# Patient Record
Sex: FEMALE | Race: WHITE | NOT HISPANIC OR LATINO | URBAN - METROPOLITAN AREA
[De-identification: names, ages, dates, MRNs, and addresses within clinical notes are randomized per-mention and may not be internally consistent; named-entity substitution may affect disease eponyms.]

---

## 2017-10-27 ENCOUNTER — OFFICE VISIT (OUTPATIENT)
Dept: FAMILY MEDICINE | Facility: CLINIC | Age: 22
End: 2017-10-27
Payer: COMMERCIAL

## 2017-10-27 VITALS
DIASTOLIC BLOOD PRESSURE: 74 MMHG | WEIGHT: 273 LBS | BODY MASS INDEX: 41.37 KG/M2 | HEIGHT: 68 IN | TEMPERATURE: 98.8 F | SYSTOLIC BLOOD PRESSURE: 116 MMHG | HEART RATE: 80 BPM

## 2017-10-27 DIAGNOSIS — Z30.09 GENERAL COUNSELING FOR PRESCRIPTION OF ORAL CONTRACEPTIVES: Primary | ICD-10-CM

## 2017-10-27 PROCEDURE — 99213 OFFICE O/P EST LOW 20 MIN: CPT | Performed by: FAMILY MEDICINE

## 2017-10-27 NOTE — PROGRESS NOTES
"  SUBJECTIVE:   Randa Arriaga is a 22 year old female who presents to clinic today for the following health issues:      Patient would like to discuss different contraceptive methods.  She was on the pill for about 3 months and could not remember to take it.  This was a year ago.  She has a boyfriend but is not having sex at this time.  She has not been sexually active in the past.  She is planning on becoming sexually active.  She is interested in nexplanon.  Her menses are very heavy.  They have been coming every 28 days now.  They last about 6-7 days with 5 heavy days.      She is doing well regarding her ADHD.  She is doing well without adderall.  She is going to go to school again in the winter. 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       Problem list and histories reviewed & adjusted, as indicated.  Additional history: as documented    BP Readings from Last 3 Encounters:   10/27/17 116/74   08/26/15 119/76    Wt Readings from Last 3 Encounters:   10/27/17 273 lb (123.8 kg)   08/26/15 222 lb (100.7 kg) (99 %)*     * Growth percentiles are based on CDC 2-20 Years data.               Reviewed and updated as needed this visit by clinical staff       Reviewed and updated as needed this visit by Provider       ROS:  Constitutional, HEENT, cardiovascular, pulmonary, gi and gu systems are negative, except as otherwise noted.      OBJECTIVE:   /74 (BP Location: Right arm, Cuff Size: Adult Large)  Pulse 80  Temp 98.8  F (37.1  C) (Oral)  Ht 5' 8.11\" (1.73 m)  Wt 273 lb (123.8 kg)  LMP 09/25/2017  BMI 41.38 kg/m2  Body mass index is 41.38 kg/(m^2).  GENERAL: alert, no distress and obese  HENT: normal cephalic/atraumatic, oropharynx clear and oral mucous membranes moist  NECK: no adenopathy, no asymmetry, masses, or scars and thyroid normal to palpation  RESP: lungs clear to auscultation - no rales, rhonchi or wheezes  CV: regular rate and rhythm, normal S1 S2, no S3 or S4, no murmur, click or rub, no " peripheral edema and peripheral pulses strong  PSYCH: mentation appears normal, affect normal/bright    Diagnostic Test Results:  none     ASSESSMENT/PLAN:       ICD-10-CM    1. General counseling for prescription of oral contraceptives Z30.09      She is interested in getting a nexplanon placed by OB    FUTURE APPOINTMENTS:       - Follow-up visit with OB for nexplanon management     Shannan Enciso DO  [facility]

## 2017-10-27 NOTE — NURSING NOTE
"Chief Complaint   Patient presents with     Contraception       Initial /74 (BP Location: Right arm, Cuff Size: Adult Large)  Pulse 80  Temp 98.8  F (37.1  C) (Oral)  Ht 5' 8.11\" (1.73 m)  Wt 273 lb (123.8 kg)  LMP 09/25/2017  BMI 41.38 kg/m2 Estimated body mass index is 41.38 kg/(m^2) as calculated from the following:    Height as of this encounter: 5' 8.11\" (1.73 m).    Weight as of this encounter: 273 lb (123.8 kg).  Medication Reconciliation: complete     Maria Luisa Morrell CMA (AAMA)      "

## 2017-10-27 NOTE — MR AVS SNAPSHOT
"              After Visit Summary   10/27/2017    Randa Arriaga    MRN: [id number]           Patient Information     Date Of Birth          1995        Visit Information        Provider Department      10/27/2017 10:00 AM Shannan Enciso DO [facility]        Today's Diagnoses     General counseling for prescription of oral contraceptives    -  1       Follow-ups after your visit        Who to contact     If you have questions or need follow up information about today's clinic visit or your schedule please contact [facility] directly at [phone].  Normal or non-critical lab and imaging results will be communicated to you by [employer], letter or phone within 4 business days after the clinic has received the results. If you do not hear from us within 7 days, please contact the clinic through [employer] or phone. If you have a critical or abnormal lab result, we will notify you by phone as soon as possible.  Submit refill requests through emoquo or call your pharmacy and they will forward the refill request to us. Please allow 3 business days for your refill to be completed.          Additional Information About Your Visit        MyChart Information     [employer]o gives you secure access to your electronic health record. If you see a primary care provider, you can also send messages to your care team and make appointments. If you have questions, please call your primary care clinic.  If you do not have a primary care provider, please call [phone] and they will assist you.        Care EveryWhere ID     This is your Care EveryWhere ID. This could be used by other organizations to access your [facility] medical records  [id number]        Your Vitals Were     Pulse Temperature Height Last Period BMI (Body Mass Index)       80 98.8  F (37.1  C) (Oral) 5' 8.11\" (1.73 m) 09/25/2017 41.38 kg/m2        Blood Pressure from Last 3 Encounters:   10/27/17 116/74   08/26/15 119/76    " Weight from Last 3 Encounters:   10/27/17 273 lb (123.8 kg)   08/26/15 222 lb (100.7 kg) (99 %)*     * Growth percentiles are based on [facility] 2-20 Years data.              Today, you had the following     No orders found for display         Today's Medication Changes          These changes are accurate as of: 10/27/17 10:39 AM.  If you have any questions, ask your nurse or doctor.               Stop taking these medicines if you haven't already. Please contact your care team if you have questions.     norethindrone-ethinyl estradiol 1-35 MG-MCG per tablet   Commonly known as:  ORTHO-NOVUM 1-35 TAB,NORTREL 1-35 TAB   Stopped by:  Shannan Enciso,                     Primary Care Provider Office Phone # Fax #    Mariella Daley PA-C [phone]       [address] [facility] [address]        Equal Access to Services     [facility] : Hadii aad ku hadasho Soomaali, waaxda luqadaha, qaybta kaalmada adeegyada, waxay clintonin haybelindan isatu peters . So [facility] [phone].    ATENCIÓN: Si habla español, tiene a borja disposición servicios gratuitos de asistencia lingüística. Eli al [phone].    We comply with applicable federal civil rights laws and [place] laws. We do not discriminate on the basis of race, color, national origin, age, disability, sex, sexual orientation, or gender identity.            Thank you!     Thank you for choosing [facility]  for your care. Our goal is always to provide you with excellent care. Hearing back from our patients is one way we can continue to improve our services. Please take a few minutes to complete the written survey that you may receive in the mail after your visit with us. Thank you!             Your Updated Medication List - Protect others around you: Learn how to safely use, store and throw away your medicines at www.disposemymeds.org.      Notice  As of 10/27/2017 10:39 AM    You have not been prescribed any medications.

## 2017-11-21 ENCOUNTER — OFFICE VISIT (OUTPATIENT)
Dept: OBGYN | Facility: CLINIC | Age: 22
End: 2017-11-21
Payer: COMMERCIAL

## 2017-11-21 VITALS
HEIGHT: 68 IN | WEIGHT: 274.4 LBS | TEMPERATURE: 98.3 F | DIASTOLIC BLOOD PRESSURE: 72 MMHG | SYSTOLIC BLOOD PRESSURE: 112 MMHG | HEART RATE: 64 BPM | BODY MASS INDEX: 41.59 KG/M2

## 2017-11-21 DIAGNOSIS — Z30.017 ENCOUNTER FOR INITIAL PRESCRIPTION OF IMPLANTABLE SUBDERMAL CONTRACEPTIVE: ICD-10-CM

## 2017-11-21 DIAGNOSIS — Z30.017 NEXPLANON INSERTION: Primary | ICD-10-CM

## 2017-11-21 DIAGNOSIS — N92.0 MENORRHAGIA WITH REGULAR CYCLE: ICD-10-CM

## 2017-11-21 LAB — BETA HCG QUAL IFA URINE: NEGATIVE

## 2017-11-21 PROCEDURE — 84703 CHORIONIC GONADOTROPIN ASSAY: CPT | Performed by: OBSTETRICS & GYNECOLOGY

## 2017-11-21 PROCEDURE — 99201 ZZC OFFICE/OUTPT VISIT, NEW, LEVEL I: CPT | Mod: 25 | Performed by: OBSTETRICS & GYNECOLOGY

## 2017-11-21 PROCEDURE — 11981 INSERTION DRUG DLVR IMPLANT: CPT | Performed by: OBSTETRICS & GYNECOLOGY

## 2017-11-21 NOTE — MR AVS SNAPSHOT
After Visit Summary   11/21/2017    Randa Arriaga    MRN: [id number]           Patient Information     Date Of Birth          1995        Visit Information        Provider Department      11/21/2017 2:30 PM Candi Daniels MD [facility]        Today's Diagnoses     Nexplanon insertion    -  1      Care Instructions    What Nexplanon Users May Expect    For appropiate patients, Nexplanon is well tolerated and has a low early-removal rate.    Insertion site complications, such as prolonged pain or infection, are rare. Removal is occasionally difficult, and rarely requires a surgical procedure in the operating room.    Menstrual changes are common with Nexplanon. Bleeding may become more or less frequent or heavy, or absent. The bleeding pattern after the first three months is predictive of future bleeding, but the pattern may change at any time. Average bleeding is 18 days over 3 months. Over 50% of women experience rare over absent bleeding over the two year period, while 25% experience frequent or prolonged bleeding.    In clinical studies, users gained 3.7 pounds over two years. It is unknown what portion of this weight gain is related to Nexplanon    Women with a history of depressed mood may have worsening on Nexplanon, and may need to have the device removed.    Return to baseline ovulation patterns is seen 7-14 days after removal of Nexplanon.    Rarely, headaches and acne have also let to device removal.    Nexplanon may be less effective in women weight more than 130% of their ideal body weight.    Nexplanon does not protect against HIV or STDs.    Please call [facility] at [phone] if you have questions or concerns.    For more complete information:  [email or web address]              Follow-ups after your visit        Who to contact     If you have questions or need follow up information about  "today's clinic visit or your schedule please contact [facility] directly at [phone].  Normal or non-critical lab and imaging results will be communicated to you by Jay[employer]hart, letter or phone within 4 business days after the clinic has received the results. If you do not hear from us within 7 days, please contact the clinic through [employer] or phone. If you have a critical or abnormal lab result, we will notify you by phone as soon as possible.  Submit refill requests through [employer] or call your pharmacy and they will forward the refill request to us. Please allow 3 business days for your refill to be completed.          Additional Information About Your Visit        Jayp[employer]stream Information     [employer] gives you secure access to your electronic health record. If you see a primary care provider, you can also send messages to your care team and make appointments. If you have questions, please call your primary care clinic.  If you do not have a primary care provider, please call [phone] and they will assist you.        Care EveryWhere ID     This is your Care EveryWhere ID. This could be used by other organizations to access your [facility] medical records  [id number]        Your Vitals Were     Pulse Temperature Height Last Period Breastfeeding? BMI (Body Mass Index)    64 98.3  F (36.8  C) (Oral) 5' 8.11\" (1.73 m) 10/29/2017 (Approximate) No 41.59 kg/m2       Blood Pressure from Last 3 Encounters:   11/21/17 112/72   10/27/17 116/74   08/26/15 119/76    Weight from Last 3 Encounters:   11/21/17 274 lb 6.4 oz (124.5 kg)   10/27/17 273 lb (123.8 kg)   08/26/15 222 lb (100.7 kg) (99 %)*     * Growth percentiles are based on CDC 2-20 Years data.              We Performed the Following     Beta HCG qual IFA urine        Primary Care Provider Office Phone # Fax #    Mariella Daley PA-C [phone]       [address] [facility] [address]        Equal Access to Services     " NEMESIO KRUSE : Hadii aad ku hadmegganbinh Sharon, wajenniferda luqadaha, qaybta kaalmada isatulauriroberto, maci zamoraaramjorge l mathur. So [facility] [place] [phone].    ATENCIÓN: Si habla español, tiene a borja disposición servicios gratuitos de asistencia lingüística. Llame al [phone].    We comply with applicable federal civil rights laws and [place] laws. We do not discriminate on the basis of race, color, national origin, age, disability, sex, sexual orientation, or gender identity.            Thank you!     Thank you for choosing [facility]  for your care. Our goal is always to provide you with excellent care. Hearing back from our patients is one way we can continue to improve our services. Please take a few minutes to complete the written survey that you may receive in the mail after your visit with us. Thank you!             Your Updated Medication List - Protect others around you: Learn how to safely use, store and throw away your medicines at www.disposemymeds.org.      Notice  As of 11/21/2017  3:06 PM    You have not been prescribed any medications.

## 2017-11-21 NOTE — PATIENT INSTRUCTIONS
What Nexplanon Users May Expect    For appropiate patients, Nexplanon is well tolerated and has a low early-removal rate.    Insertion site complications, such as prolonged pain or infection, are rare. Removal is occasionally difficult, and rarely requires a surgical procedure in the operating room.    Menstrual changes are common with Nexplanon. Bleeding may become more or less frequent or heavy, or absent. The bleeding pattern after the first three months is predictive of future bleeding, but the pattern may change at any time. Average bleeding is 18 days over 3 months. Over 50% of women experience rare over absent bleeding over the two year period, while 25% experience frequent or prolonged bleeding.    In clinical studies, users gained 3.7 pounds over two years. It is unknown what portion of this weight gain is related to Nexplanon    Women with a history of depressed mood may have worsening on Nexplanon, and may need to have the device removed.    Return to baseline ovulation patterns is seen 7-14 days after removal of Nexplanon.    Rarely, headaches and acne have also let to device removal.    Nexplanon may be less effective in women weight more than 130% of their ideal body weight.    Nexplanon does not protect against HIV or STDs.    Please call [facility] at [phone] if you have questions or concerns.    For more complete information:  [email or web address]

## 2017-11-21 NOTE — PROGRESS NOTES
"Chief Complaint   Patient presents with     Consult     Contraception       Initial /72 (BP Location: Right arm, Patient Position: Sitting, Cuff Size: Adult Large)  Pulse 64  Temp 98.3  F (36.8  C) (Oral)  Ht 5' 8.11\" (1.73 m)  Wt 274 lb 6.4 oz (124.5 kg)  LMP 10/29/2017 (Approximate)  Breastfeeding? No  BMI 41.59 kg/m2 Estimated body mass index is 41.59 kg/(m^2) as calculated from the following:    Height as of this encounter: 5' 8.11\" (1.73 m).    Weight as of this encounter: 274 lb 6.4 oz (124.5 kg).  BP completed using cuff size: large        The following HM Due: NONE      The following patient reported/Care Every where data was sent to:  P ABSTRACT QUALITY INITIATIVES [64445]  n/a      n/a and patient has appointment for today          NEXPLANON  LOT: [id number]  EXP: 2019   NDC: 0554-1136-61      I was asked to see Randa Arriaga  by Dr Enciso  for consultation regarding nexplanon for contraception and cycle control    HPI:  Randa Arriaga  is a 22 year old here for a consultation regarding: wants to get on nexplanon.  She has discussed this with Dr Enciso and again today with me.  She has heavy periods and is hoping that this might help with her bleeding. In addition she is in a relationship and will need contraception as well.  She is not sexually active at the present time.    Periods are monthly and regular. On the heavy days she will go thru 2 tampons in one hour. Hoping this might help with the bleeding    Currently considering nexplanon for reliable non estrogen reversible contraception.   Tried oral contraceptive pills in the past but would forget to take it.     Currently in a relationship  Patient's last menstrual period was Patient's last menstrual period was 10/29/2017 (approximate).     Left  is her dominant hand         No past medical history on file.     Past Surgical History:   Procedure Laterality Date     ENT SURGERY      PE tubes under 1 year      ORTHOPEDIC " "SURGERY      9th right hand 2nd digit finger       Social History   Substance Use Topics     Smoking status: Never Smoker     Smokeless tobacco: Never Used     Alcohol use No       No current outpatient prescriptions on file.        No Known Allergies        EXAM:    /72 (BP Location: Right arm, Patient Position: Sitting, Cuff Size: Adult Large)  Pulse 64  Temp 98.3  F (36.8  C) (Oral)  Ht 5' 8.11\" (1.73 m)  Wt 274 lb 6.4 oz (124.5 kg)  LMP 10/29/2017 (Approximate)  Breastfeeding? No  BMI 41.59 kg/m2    General - pleasant female in no acute distress.  Neurological - alert and oriented X 3  Psychiatric - normal mood and affect  Right inner arm normal.      we spent over 10 min of today's visit discussing contraceptive options and specific R/B/SE of nexplanon.         ASSESSMENT:     Encounter Diagnoses   Name Primary?     Nexplanon insertion Yes     Menorrhagia with regular cycle      Encounter for initial prescription of implantable subdermal contraceptive         PLAN:   Desires nexplanon.  we spent over 10 min of today's visit discussing contraceptive options and specific R/B/SE of nexplanon.  We specifically reviewed the risk of bleeding. We discussed options for management of abnormal bleeding with nexplanon as well.   A complete discussion of the risks and benefits of nexplanon use and the details of the insertion procedure was held with the patient. All questions were answered. A consent form was signed.       Candi Daniels MD           PROCEDURE:    Preprocedure medications: 1% plain lidocaine, 5 ml  Nexplanon Lot # see nursing notes    Patient's allergies were confirmed. The patient was placed in the supine position with her (non-dominant) arm flexed at the elbow, externally rotated, and placed with her wrist parallel to her ear.  The insertion site was marked with a sterile marker. The area was cleaned with alcohol swabs and anesthetized with 5 ml of 1% lidocaine without epinephrine along " the planned insertion tunnel. Betadine swabs were used to prep the area of insertion.  The Nexplanon applicator was removed from its blister. The needle shield was removed, maintaining the applicator upright. The white implant was visualized in the needle tip. Counter-traction was applied to the skin at the needle insertion site.  The tip of the needle was inserted at the site, beveled side up, at a 30-degree angle. The applicator was then lowered to a horizontal position. While lifting the skin with the tip of the needle, the needle was inserted to its full length. The slider was unlocked and moved fully back to the stop.   The 4 cm shayla was palpated under the skin. The patient also palpated the shayla. Pressure was held over the incision and steri strips were placed.   A bandage was applied with sterile gauze. The patient was instructed to remove the bangage in 24 hours and replace with a band-aid.  The patient tolerated the procedures well and there were no complications.     The user card was filled out and given to the patient to keep.  The Patient Chart Label was completed and sent for scanning.       A/P:  Contraception management   successful nexplanon insertion   We discussed signs/symptoms of infection and when to call.  We again reviewed potential side effects including irregular bleeding.  She is to call with any questions.  Otherwise, RTC prn.        FOLLOW-UP:  She was asked to follow up for any problems.   The patient was asked to contact the clinic for any fever/chills/insertion site pain or heavy bleeding.     Candi Daniels MD     Cc DR. Shannan Enciso

## 2018-01-15 ENCOUNTER — TELEPHONE (OUTPATIENT)
Dept: FAMILY MEDICINE | Facility: CLINIC | Age: 23
End: 2018-01-15

## 2018-01-15 NOTE — TELEPHONE ENCOUNTER
1/15/2018    Call Regarding ReattributionPhysical    Attempt 1    Message on voicemail     Comments:           Outreach   AT

## 2018-02-23 NOTE — TELEPHONE ENCOUNTER
2/23/2018    Call Regarding Reattribution Physical     Attempt 2    Message on voicemail     Comments:       Outreach   Kayla Benavidez

## 2018-08-28 ENCOUNTER — HEALTH MAINTENANCE LETTER (OUTPATIENT)
Age: 23
End: 2018-08-28

## 2018-09-26 ENCOUNTER — OFFICE VISIT (OUTPATIENT)
Dept: PODIATRY | Facility: CLINIC | Age: 23
End: 2018-09-26
Payer: COMMERCIAL

## 2018-09-26 VITALS
WEIGHT: 230 LBS | BODY MASS INDEX: 32.2 KG/M2 | DIASTOLIC BLOOD PRESSURE: 70 MMHG | HEIGHT: 71 IN | SYSTOLIC BLOOD PRESSURE: 118 MMHG

## 2018-09-26 DIAGNOSIS — L60.0 INGROWING NAIL: Primary | ICD-10-CM

## 2018-09-26 PROCEDURE — 99203 OFFICE O/P NEW LOW 30 MIN: CPT | Mod: 25 | Performed by: PODIATRIST

## 2018-09-26 PROCEDURE — 11730 AVULSION NAIL PLATE SIMPLE 1: CPT | Performed by: PODIATRIST

## 2018-09-26 ASSESSMENT — PAIN SCALES - GENERAL: PAINLEVEL: MODERATE PAIN (4)

## 2018-09-26 NOTE — PROGRESS NOTES
"PATIENT HISTORY:  Randa Arriaga is a 23 year old female who presents to clinic for L 1st toe pain.  Pain along the nail.  2-3 month duration.  Started after stubbing the toe.  1st occurrence.  4-7/10 pain.  Worse with pressure, better with rest.      Review of Systems:  Patient denies fever, chills, rash, wound, stiffness, limping, numbness, weakness, heart burn, blood in stool, chest pain with activity, calf pain when walking, shortness of breath with activity, chronic cough, easy bleeding/bruising, swelling of ankles, excessive thirst, fatigue, depression, anxiety.       PAST MEDICAL HISTORY: No pertinent past medical history.       PAST SURGICAL HISTORY:   Past Surgical History:   Procedure Laterality Date     ENT SURGERY      PE tubes under 1 year      ORTHOPEDIC SURGERY      9th right hand 2nd digit finger        MEDICATIONS: No current outpatient prescriptions on file.     ALLERGIES:  No Known Allergies     SOCIAL HISTORY:   Social History     Social History     Marital status: Single     Spouse name: N/A     Number of children: N/A     Years of education: N/A     Occupational History     Not on file.     Social History Main Topics     Smoking status: Never Smoker     Smokeless tobacco: Never Used     Alcohol use No     Drug use: No     Sexual activity: Not Currently     Partners: Male     Birth control/ protection: None     Other Topics Concern     Parent/Sibling W/ Cabg, Mi Or Angioplasty Before 65f 55m? No     Social History Narrative        FAMILY HISTORY: No pertinent family history.     EXAM:Vitals: /70  Ht 5' 11\" (1.803 m)  Wt 230 lb (104.3 kg)  BMI 32.08 kg/m2  BMI= Body mass index is 32.08 kg/(m^2).    General appearance: Patient is alert and fully cooperative with history & exam.  No sign of distress is noted during the visit.     Psychiatric: Affect is pleasant & appropriate.  Patient appears motivated to improve health.     Respiratory: Breathing is regular & unlabored while sitting.   "   HEENT: Hearing is intact to spoken word.  Speech is clear.  No gross evidence of visual impairment that would impact ambulation.     Dermatologic: L 1st toenail ingrown along lateral border with mild local redness.  No drainage.  Pain noted.       Vascular: DP & PT pulses are intact & regular on the L  No significant edema or varicosities noted.  CFT and skin temperature are normal.     Neurologic: Lower extremity sensation is intact to light touch.  No evidence of weakness or contracture in the lower extremities.  No evidence of neuropathy.     Musculoskeletal: Patient is ambulatory without assistive device or brace.  No gross ankle deformity noted.  No foot or ankle joint effusion is noted.     ASSESSMENT: L 1st toe ingrown nail     PLAN:  Reviewed patient's chart in epic.  Discussed condition and treatment options including pros and cons.    The potential causes and nature of an ingrown toenail were discussed with the patient.  We reviewed the natural history/prognosis of the condition and potential risks if no treatment is provided.      Treatment options discussed included conservative management (oral antibiotics, soaking of foot, adequate width shoes)  as well as surgical management (partial or total nail removal).  The pros and cons of both forms of treatment were reviewed.      After thorough discussion and answering all questions, the patient elected to proceed with nonpermanent partial nail avulsion.  Discussed risk of recurrence.    Procedure:  In addition to office visit.  After consent, the left 1st toe was anesthetized with 5cc's of 1% lidocaine plain after alcohol prep. Betadine prep performed.  A tourniquet was applied to the toe. Using sterile instrumentation, the lateral border was then raised from the nail bed and then cut the length of the nail.  The offending nail border was then removed.  Bacitracin was applied to the nail bed.  The tourniquet was removed and a hyperemic response was noted.   Bandage was applied to the toe.  The patient tolerated the procedure and anesthesia well.  No abscess noted.    Post op instructions provided and discussed with pt.  F/u prn.  I don't see a need for oral abx.       Roberto Carlos Lucas DPM, FACFAS    Weight management plan: Patient was referred to their PCP to discuss a diet and exercise plan.

## 2018-09-26 NOTE — MR AVS SNAPSHOT
After Visit Summary   9/26/2018    Randa Arriaga    MRN: [id number]           Patient Information     Date Of Birth          1995        Visit Information        Provider Department      9/26/2018 11:15 AM Roberto Carlos Silva DPM [facility]        Today's Diagnoses     Ingrowing nail    -  1      Care Instructions    Thank you for choosing [facility]!    Follow up as needed     DR. SILVA'S CLINIC LOCATIONS     MONDAY  [place] TUESDAY & FRIDAY AM  EMILY   [address] [facility]   [address]   [place] [address] [place] [address]   [phone]  [phone]       WEDNESDAY  [place] SCHEDULE SURGERY: [phone]   [address] [place] [address] APPOINTMENTS: [phone]   [place] [address] BILLING QUESTIONS: [phone]   [phone]       Ingrown Toenail          What Is an Ingrown Toenail?  When a toenail is ingrown, it is curved and grows into the skin, usually at the nail borders (the sides of the nail). This  digging in  of the nail irritates the skin, often creating pain, redness, swelling, and warmth in the toe.  If an ingrown nail causes a break in the skin, bacteria may enter and cause an infection in the area, which is often marked by drainage and a foul odor. However, even if the toe isn t painful, red, swollen, or warm, a nail that curves downward into the skin can progress to an infection.  Causes  Causes of ingrown toenails include:  Heredity. In many people, the tendency for ingrown toenails is inherited.   Trauma. Sometimes an ingrown toenail is the result of trauma, such as stubbing your toe, having an object fall on your toe, or engaging in activities that involve repeated pressure on the toes, such as kicking or running.   Improper trimming. The most common cause of ingrown toenails is cutting your nails too short. This encourages the skin next to the  nail to fold over the nail.   Improperly sized footwear. Ingrown toenails can result from wearing socks and shoes that are tight or short.   Nail Conditions. Ingrown toenails can be caused by nail problems, such as fungal infections or losing a nail due to trauma.   Treatment  Sometimes initial treatment for ingrown toenails can be safely performed at home. However, home treatment is strongly discouraged if an infection is suspected, or for those who have medical conditions that put feet at high risk, such as diabetes, nerve damage in the foot, or poor circulation.  Home care:  If you don t have an infection or any of the above medical conditions, you can soak your foot in room-temperature water (adding Epsom s salt may be recommended by your doctor), and gently massage the side of the nail fold to help reduce the inflammation.  Avoid attempting  bathroom surgery.  Repeated cutting of the nail can cause the condition to worsen over time. If your symptoms fail to improve, it s time to see a foot and ankle surgeon.  Physician care:  After examining the toe, the foot and ankle surgeon will select the treatment best suited for you. If an infection is present, an oral antibiotic may be prescribed.  Sometimes a minor surgical procedure, often performed in the office, will ease the pain and remove the offending nail. After applying a local anesthetic, the doctor removes part of the nail s side border. Some nails may become ingrown again, requiring removal of the nail root.  Following the nail procedure, a light bandage will be applied. Most people experience very little pain after surgery and may resume normal activity the next day. If your surgeon has prescribed an oral antibiotic, be sure to take all the medication, even if your symptoms have improved.      Preventing Ingrown Toenails  Many cases of ingrown toenails may be prevented by:  Proper trimming. Cut toenails in a fairly straight line, and don t cut them too  short. You should be able to get your fingernail under the sides and end of the nail.   Well-fitted shoes and socks. Don t wear shoes that are short or tight in the toe area. Avoid shoes that are loose, because they too cause pressure on the toes, especially when running or walking briskly.           INGROWN TOENAIL POSTOPERATIVE INSTRUCTIONS   (Nail avulsion or chemical matrixectomy)   1 Go directly home and elevate the affected foot on one or two pillows for the remainder of the day/evening. Your toe may stay numb for the next 2-8 hours.   2 Take Tylenol, ibuprofen or another anti-inflammatory as needed for pain.   3 Take antibiotic if that has been prescribed. Take the entire prescribed antibiotic even if your symptoms have improved.   4 Keep dressing dry and intact the day of the procedure. The morning after the procedure, remove entire dressing and soak/wash the affected area in warm water (you may add Epsom salt) for 5 to 10 minutes. Do this twice a day for 2-4 weeks (6-8 weeks if you had phenol) (you may count showering/bathing as one soak).  After soaks, pat the area dry and then allow to airdry for a few minutes. Apply antibiotic ointment to the area and cover with 2 X 2 gauze and paper tape or a band-aid.  5 May walk and pursue everyday activities as tolerated with either an open toe shoe or cut-out shoe as needed. May wear regular shoes if no pain is noted.  6 Watch for any signs and symptoms of infection such as: redness, red streaks going up the foot/leg, swelling, pus or foul odor. Those that have had the phenol procedure, the toe will drain longer and will look like it is infected because it is a chemical burn.  Please call with questions.        Body Mass Index (BMI)  Many things can cause foot and ankle problems. Foot structure, activity level, foot mechanics and injuries are common causes of pain.  One very important issue that often goes unmentioned, is body weight. Extra weight can cause  increased stress on muscles, ligaments, bones and tendons.  Sometimes just a few extra pounds is all it takes to put one over her/his threshold. Without reducing that stress, it can be difficult to alleviate pain. Some people are uncomfortable addressing this issue, but we feel it is important for you to think about it. As Foot &  Ankle specialists, our job is addressing the lower extremity problem and possible causes. Regarding extra body weight, we encourage patients to discuss diet and weight management plans with their primary care doctors. It is this team approach that gives you the best opportunity for pain relief and getting you back on your feet.            Follow-ups after your visit        Who to contact     If you have questions or need follow up information about today's clinic visit or your schedule please contact [facility] directly at [phone].  Normal or non-critical lab and imaging results will be communicated to you by Sample6hart, letter or phone within 4 business days after the clinic has received the results. If you do not hear from us within 7 days, please contact the clinic through Sample6hart or phone. If you have a critical or abnormal lab result, we will notify you by phone as soon as possible.  Submit refill requests through SwypeShield or call your pharmacy and they will forward the refill request to us. Please allow 3 business days for your refill to be completed.          Additional Information About Your Visit        SwypeShield Information     SwypeShield gives you secure access to your electronic health record. If you see a primary care provider, you can also send messages to your care team and make appointments. If you have questions, please call your primary care clinic.  If you do not have a primary care provider, please call [phone] and they will assist you.        Care EveryWhere ID     This is your Care EveryWhere ID. This could be used by other organizations to access  "your [facility] medical records  [id number]        Your Vitals Were     Height BMI (Body Mass Index)                5' 11\" (1.803 m) 32.08 kg/m2           Blood Pressure from Last 3 Encounters:   09/26/18 118/70   11/21/17 112/72   10/27/17 116/74    Weight from Last 3 Encounters:   09/26/18 230 lb (104.3 kg)   11/21/17 274 lb 6.4 oz (124.5 kg)   10/27/17 273 lb (123.8 kg)              We Performed the Following     REMOVAL OF NAIL PLATE SIMPLE SINGLE        Primary Care Provider Office Phone # Fax #    Mariella Daley PA-C [phone]       [address] [address]        Equal Access to Services     NEMESIO KRUSE : Hadii aad ku hadasho Soomaali, waaxda luqadaha, qaybta kaalmada adeegyada, maci enriquez hayzack peters . So [facility] [phone].    ATENCIÓN: Si habla español, tiene a borja disposición servicios gratuitos de asistencia lingüística. Llame al [phone].    We comply with applicable federal civil rights laws and [place] laws. We do not discriminate on the basis of race, color, national origin, age, disability, sex, sexual orientation, or gender identity.            Thank you!     Thank you for choosing [facility]  for your care. Our goal is always to provide you with excellent care. Hearing back from our patients is one way we can continue to improve our services. Please take a few minutes to complete the written survey that you may receive in the mail after your visit with us. Thank you!             Your Updated Medication List - Protect others around you: Learn how to safely use, store and throw away your medicines at www.disposemymeds.org.      Notice  As of 9/26/2018 11:50 AM    You have not been prescribed any medications.      "

## 2018-09-26 NOTE — LETTER
"    9/26/2018         RE: Randa Arriaga  [address] [address]        Dear Colleague,    Thank you for referring your patient, Randa Arriaga, to the [facility]. Please see a copy of my visit note below.    PATIENT HISTORY:  Randa Arriaga is a 23 year old female who presents to clinic for L 1st toe pain.  Pain along the nail.  2-3 month duration.  Started after stubbing the toe.  1st occurrence.  4-7/10 pain.  Worse with pressure, better with rest.      Review of Systems:  Patient denies fever, chills, rash, wound, stiffness, limping, numbness, weakness, heart burn, blood in stool, chest pain with activity, calf pain when walking, shortness of breath with activity, chronic cough, easy bleeding/bruising, swelling of ankles, excessive thirst, fatigue, depression, anxiety.       PAST MEDICAL HISTORY: No pertinent past medical history.       PAST SURGICAL HISTORY:   Past Surgical History:   Procedure Laterality Date     ENT SURGERY      PE tubes under 1 year      ORTHOPEDIC SURGERY      9th right hand 2nd digit finger        MEDICATIONS: No current outpatient prescriptions on file.     ALLERGIES:  No Known Allergies     SOCIAL HISTORY:   Social History     Social History     Marital status: Single     Spouse name: N/A     Number of children: N/A     Years of education: N/A     Occupational History     Not on file.     Social History Main Topics     Smoking status: Never Smoker     Smokeless tobacco: Never Used     Alcohol use No     Drug use: No     Sexual activity: Not Currently     Partners: Male     Birth control/ protection: None     Other Topics Concern     Parent/Sibling W/ Cabg, Mi Or Angioplasty Before 65f 55m? No     Social History Narrative        FAMILY HISTORY: No pertinent family history.     EXAM:Vitals: /70  Ht 5' 11\" (1.803 m)  Wt 230 lb (104.3 kg)  BMI 32.08 kg/m2  BMI= Body mass index is 32.08 kg/(m^2).    General appearance: Patient is alert and " fully cooperative with history & exam.  No sign of distress is noted during the visit.     Psychiatric: Affect is pleasant & appropriate.  Patient appears motivated to improve health.     Respiratory: Breathing is regular & unlabored while sitting.     HEENT: Hearing is intact to spoken word.  Speech is clear.  No gross evidence of visual impairment that would impact ambulation.     Dermatologic: L 1st toenail ingrown along lateral border with mild local redness.  No drainage.  Pain noted.       Vascular: DP & PT pulses are intact & regular on the L  No significant edema or varicosities noted.  CFT and skin temperature are normal.     Neurologic: Lower extremity sensation is intact to light touch.  No evidence of weakness or contracture in the lower extremities.  No evidence of neuropathy.     Musculoskeletal: Patient is ambulatory without assistive device or brace.  No gross ankle deformity noted.  No foot or ankle joint effusion is noted.     ASSESSMENT: L 1st toe ingrown nail     PLAN:  Reviewed patient's chart in epic.  Discussed condition and treatment options including pros and cons.    The potential causes and nature of an ingrown toenail were discussed with the patient.  We reviewed the natural history/prognosis of the condition and potential risks if no treatment is provided.      Treatment options discussed included conservative management (oral antibiotics, soaking of foot, adequate width shoes)  as well as surgical management (partial or total nail removal).  The pros and cons of both forms of treatment were reviewed.      After thorough discussion and answering all questions, the patient elected to proceed with nonpermanent partial nail avulsion.  Discussed risk of recurrence.    Procedure:  In addition to office visit.  After consent, the left 1st toe was anesthetized with 5cc's of 1% lidocaine plain after alcohol prep. Betadine prep performed.  A tourniquet was applied to the toe. Using sterile  instrumentation, the lateral border was then raised from the nail bed and then cut the length of the nail.  The offending nail border was then removed.  Bacitracin was applied to the nail bed.  The tourniquet was removed and a hyperemic response was noted.  Bandage was applied to the toe.  The patient tolerated the procedure and anesthesia well.  No abscess noted.    Post op instructions provided and discussed with pt.  F/u prn.  I don't see a need for oral abx.       Roberto Carlos Lucas DPM, FACFAS    Weight management plan: Patient was referred to their PCP to discuss a diet and exercise plan.      Again, thank you for allowing me to participate in the care of your patient.        Sincerely,        Roberto Carlos Lucas DPM

## 2018-09-26 NOTE — PATIENT INSTRUCTIONS
Thank you for choosing [facility]!    Follow up as needed     DR. SILVA'S CLINIC LOCATIONS     MONDAY  [place] TUESDAY & FRIDAY AM  EMILY   [address]   [address]   [place] [address] [place] [address]   [phone]  [phone]       WEDNESDAY  [place] SCHEDULE SURGERY: [phone]   [address] [facility] APPOINTMENTS: [phone]   [place] [address] BILLING QUESTIONS: [phone]   [phone]       Ingrown Toenail          What Is an Ingrown Toenail?  When a toenail is ingrown, it is curved and grows into the skin, usually at the nail borders (the sides of the nail). This  digging in  of the nail irritates the skin, often creating pain, redness, swelling, and warmth in the toe.  If an ingrown nail causes a break in the skin, bacteria may enter and cause an infection in the area, which is often marked by drainage and a foul odor. However, even if the toe isn t painful, red, swollen, or warm, a nail that curves downward into the skin can progress to an infection.  Causes  Causes of ingrown toenails include:  Heredity. In many people, the tendency for ingrown toenails is inherited.   Trauma. Sometimes an ingrown toenail is the result of trauma, such as stubbing your toe, having an object fall on your toe, or engaging in activities that involve repeated pressure on the toes, such as kicking or running.   Improper trimming. The most common cause of ingrown toenails is cutting your nails too short. This encourages the skin next to the nail to fold over the nail.   Improperly sized footwear. Ingrown toenails can result from wearing socks and shoes that are tight or short.   Nail Conditions. Ingrown toenails can be caused by nail problems, such as fungal infections or losing a nail due to trauma.   Treatment  Sometimes initial treatment for ingrown toenails can be safely performed at home.  However, home treatment is strongly discouraged if an infection is suspected, or for those who have medical conditions that put feet at high risk, such as diabetes, nerve damage in the foot, or poor circulation.  Home care:  If you don t have an infection or any of the above medical conditions, you can soak your foot in room-temperature water (adding Epsom s salt may be recommended by your doctor), and gently massage the side of the nail fold to help reduce the inflammation.  Avoid attempting  bathroom surgery.  Repeated cutting of the nail can cause the condition to worsen over time. If your symptoms fail to improve, it s time to see a foot and ankle surgeon.  Physician care:  After examining the toe, the foot and ankle surgeon will select the treatment best suited for you. If an infection is present, an oral antibiotic may be prescribed.  Sometimes a minor surgical procedure, often performed in the office, will ease the pain and remove the offending nail. After applying a local anesthetic, the doctor removes part of the nail s side border. Some nails may become ingrown again, requiring removal of the nail root.  Following the nail procedure, a light bandage will be applied. Most people experience very little pain after surgery and may resume normal activity the next day. If your surgeon has prescribed an oral antibiotic, be sure to take all the medication, even if your symptoms have improved.      Preventing Ingrown Toenails  Many cases of ingrown toenails may be prevented by:  Proper trimming. Cut toenails in a fairly straight line, and don t cut them too short. You should be able to get your fingernail under the sides and end of the nail.   Well-fitted shoes and socks. Don t wear shoes that are short or tight in the toe area. Avoid shoes that are loose, because they too cause pressure on the toes, especially when running or walking briskly.           INGROWN TOENAIL POSTOPERATIVE INSTRUCTIONS   (Nail avulsion or  chemical matrixectomy)   1 Go directly home and elevate the affected foot on one or two pillows for the remainder of the day/evening. Your toe may stay numb for the next 2-8 hours.   2 Take Tylenol, ibuprofen or another anti-inflammatory as needed for pain.   3 Take antibiotic if that has been prescribed. Take the entire prescribed antibiotic even if your symptoms have improved.   4 Keep dressing dry and intact the day of the procedure. The morning after the procedure, remove entire dressing and soak/wash the affected area in warm water (you may add Epsom salt) for 5 to 10 minutes. Do this twice a day for 2-4 weeks (6-8 weeks if you had phenol) (you may count showering/bathing as one soak).  After soaks, pat the area dry and then allow to airdry for a few minutes. Apply antibiotic ointment to the area and cover with 2 X 2 gauze and paper tape or a band-aid.  5 May walk and pursue everyday activities as tolerated with either an open toe shoe or cut-out shoe as needed. May wear regular shoes if no pain is noted.  6 Watch for any signs and symptoms of infection such as: redness, red streaks going up the foot/leg, swelling, pus or foul odor. Those that have had the phenol procedure, the toe will drain longer and will look like it is infected because it is a chemical burn.  Please call with questions.        Body Mass Index (BMI)  Many things can cause foot and ankle problems. Foot structure, activity level, foot mechanics and injuries are common causes of pain.  One very important issue that often goes unmentioned, is body weight. Extra weight can cause increased stress on muscles, ligaments, bones and tendons.  Sometimes just a few extra pounds is all it takes to put one over her/his threshold. Without reducing that stress, it can be difficult to alleviate pain. Some people are uncomfortable addressing this issue, but we feel it is important for you to think about it. As Foot &  Ankle specialists, our job is addressing  the lower extremity problem and possible causes. Regarding extra body weight, we encourage patients to discuss diet and weight management plans with their primary care doctors. It is this team approach that gives you the best opportunity for pain relief and getting you back on your feet.

## 2019-01-09 ENCOUNTER — OFFICE VISIT (OUTPATIENT)
Dept: PODIATRY | Facility: CLINIC | Age: 24
End: 2019-01-09
Payer: COMMERCIAL

## 2019-01-09 VITALS
HEIGHT: 71 IN | DIASTOLIC BLOOD PRESSURE: 84 MMHG | HEART RATE: 68 BPM | SYSTOLIC BLOOD PRESSURE: 112 MMHG | BODY MASS INDEX: 39.34 KG/M2 | WEIGHT: 281 LBS | TEMPERATURE: 98 F

## 2019-01-09 DIAGNOSIS — L60.0 INGROWING NAIL: Primary | ICD-10-CM

## 2019-01-09 PROCEDURE — 11730 AVULSION NAIL PLATE SIMPLE 1: CPT | Performed by: PODIATRIST

## 2019-01-09 ASSESSMENT — MIFFLIN-ST. JEOR: SCORE: 2135.74

## 2019-01-09 NOTE — LETTER
1/9/2019         RE: Randa Arriaga  [address] [facility] [address]        Dear Colleague,    Thank you for referring your patient, Randa Arriaga, to the [facility]. Please see a copy of my visit note below.    Weight management plan: Patient was referred to their PCP to discuss a diet and exercise plan.      PATIENT HISTORY:  Randa Arriaga is a 23 year old female who presents to clinic for right 1st toe pain.  Hx of L ingrown nail.  Pain along R 1st toenail.  2 week duration.  1st occurrence.  No treatments.    Review of Systems:  Patient denies fever, chills, rash, wound, stiffness, limping, numbness, weakness, heart burn, blood in stool, chest pain with activity, calf pain when walking, shortness of breath with activity, chronic cough, easy bleeding/bruising, swelling of ankles, excessive thirst, fatigue, depression, anxiety.      PAST MEDICAL HISTORY: History reviewed. No pertinent past medical history.     PAST SURGICAL HISTORY:   Past Surgical History:   Procedure Laterality Date     ENT SURGERY      PE tubes under 1 year      ORTHOPEDIC SURGERY      9th right hand 2nd digit finger        MEDICATIONS: No current outpatient medications on file.     ALLERGIES:  No Known Allergies     SOCIAL HISTORY:   Social History     Socioeconomic History     Marital status: Single     Spouse name: Not on file     Number of children: Not on file     Years of education: Not on file     Highest education level: Not on file   Social Needs     Financial resource strain: Not on file     Food insecurity - worry: Not on file     Food insecurity - inability: Not on file     Transportation needs - medical: Not on file     Transportation needs - non-medical: Not on file   Occupational History     Not on file   Tobacco Use     Smoking status: Never Smoker     Smokeless tobacco: Never Used   Substance and Sexual Activity     Alcohol use: No     Drug use: No     Sexual activity: Not Currently      "Partners: Male     Birth control/protection: None   Other Topics Concern     Parent/sibling w/ CABG, MI or angioplasty before 65F 55M? No   Social History Narrative     Not on file        FAMILY HISTORY: History reviewed. No pertinent family history.     EXAM:Vitals: /84 (Cuff Size: Adult Large)   Pulse 68   Temp 98  F (36.7  C) (Oral)   Ht 1.803 m (5' 11\")   Wt 127.5 kg (281 lb)   BMI 39.19 kg/m     BMI= Body mass index is 39.19 kg/m .    General appearance: Patient is alert and fully cooperative with history & exam.  No sign of distress is noted during the visit.     Psychiatric: Affect is pleasant & appropriate.  Patient appears motivated to improve health.     Respiratory: Breathing is regular & unlabored while sitting.     HEENT: Hearing is intact to spoken word.  Speech is clear.  No gross evidence of visual impairment that would impact ambulation.     Dermatologic: R hallux nail ingrown along lateral border with pain.  Does not appear infected today.     Vascular: DP & PT pulses are intact & regular on the R.  No significant edema or varicosities noted.  CFT and skin temperature are normal to both lower extremities.     Neurologic: Lower extremity sensation is intact to light touch.  No evidence of weakness or contracture in the lower extremities.  No evidence of neuropathy.     Musculoskeletal: Patient is ambulatory without assistive device or brace.  No gross ankle deformity noted.  No foot or ankle joint effusion is noted.     ASSESSMENT: R hallux ingrown nail.     PLAN:  Reviewed patient's chart in epic.  Discussed condition and treatment options including pros and cons.    The potential causes and nature of an ingrown toenail were discussed with the patient.  We reviewed the natural history/prognosis of the condition and potential risks if no treatment is provided.      Treatment options discussed included conservative management (soaking of foot, adequate width shoes)  as well as surgical " management (partial or total nail removal).  The pros and cons of both forms of treatment were reviewed.      After thorough discussion and answering all questions, the patient elected to proceed with nonpermanent partial nail avulsion.  Discussed risk of recurrence.    Procedure:  In addition to office visit.  After consent, the right 1st toe was anesthetized with 5cc's of 1% lidocaine plain after alcohol prep. Betadine prep performed.  A tourniquet was applied to the toe. Using sterile instrumentation, the medial border was then raised from the nail bed and then cut the length of the nail.  The offending nail border was then removed.   Bacitracin was applied to the nail bed.  The tourniquet was removed and a hyperemic response was noted.  Bandage was applied to the toe.  The patient tolerated the procedure and anesthesia well.    Post op instructions provided and discussed with pt.  F/u prn.        Roberto Carlos Lucas DPM, FACFAS    Weight management plan: Patient was referred to their PCP to discuss a diet and exercise plan.      Again, thank you for allowing me to participate in the care of your patient.        Sincerely,        Roberto Carlos Lucas DPM

## 2019-01-09 NOTE — PATIENT INSTRUCTIONS
Ingrown Toenail          What Is an Ingrown Toenail?  When a toenail is ingrown, it is curved and grows into the skin, usually at the nail borders (the sides of the nail). This  digging in  of the nail irritates the skin, often creating pain, redness, swelling, and warmth in the toe.  If an ingrown nail causes a break in the skin, bacteria may enter and cause an infection in the area, which is often marked by drainage and a foul odor. However, even if the toe isn t painful, red, swollen, or warm, a nail that curves downward into the skin can progress to an infection.  Causes  Causes of ingrown toenails include:  Heredity. In many people, the tendency for ingrown toenails is inherited.   Trauma. Sometimes an ingrown toenail is the result of trauma, such as stubbing your toe, having an object fall on your toe, or engaging in activities that involve repeated pressure on the toes, such as kicking or running.   Improper trimming. The most common cause of ingrown toenails is cutting your nails too short. This encourages the skin next to the nail to fold over the nail.   Improperly sized footwear. Ingrown toenails can result from wearing socks and shoes that are tight or short.   Nail Conditions. Ingrown toenails can be caused by nail problems, such as fungal infections or losing a nail due to trauma.   Treatment  Sometimes initial treatment for ingrown toenails can be safely performed at home. However, home treatment is strongly discouraged if an infection is suspected, or for those who have medical conditions that put feet at high risk, such as diabetes, nerve damage in the foot, or poor circulation.  Home care:  If you don t have an infection or any of the above medical conditions, you can soak your foot in room-temperature water (adding Epsom s salt may be recommended by your doctor), and gently massage the side of the nail fold to help reduce the inflammation.  Avoid attempting  bathroom surgery.  Repeated cutting of  the nail can cause the condition to worsen over time. If your symptoms fail to improve, it s time to see a foot and ankle surgeon.  Physician care:  After examining the toe, the foot and ankle surgeon will select the treatment best suited for you. If an infection is present, an oral antibiotic may be prescribed.  Sometimes a minor surgical procedure, often performed in the office, will ease the pain and remove the offending nail. After applying a local anesthetic, the doctor removes part of the nail s side border. Some nails may become ingrown again, requiring removal of the nail root.  Following the nail procedure, a light bandage will be applied. Most people experience very little pain after surgery and may resume normal activity the next day. If your surgeon has prescribed an oral antibiotic, be sure to take all the medication, even if your symptoms have improved.      Preventing Ingrown Toenails  Many cases of ingrown toenails may be prevented by:  Proper trimming. Cut toenails in a fairly straight line, and don t cut them too short. You should be able to get your fingernail under the sides and end of the nail.   Well-fitted shoes and socks. Don t wear shoes that are short or tight in the toe area. Avoid shoes that are loose, because they too cause pressure on the toes, especially when running or walking briskly.           INGROWN TOENAIL POSTOPERATIVE INSTRUCTIONS   (Nail avulsion or chemical matrixectomy)   1 Go directly home and elevate the affected foot on one or two pillows for the remainder of the day/evening. Your toe may stay numb for the next 2-8 hours.   2 Take Tylenol, ibuprofen or another anti-inflammatory as needed for pain.   3 Take antibiotic if that has been prescribed. Take the entire prescribed antibiotic even if your symptoms have improved.   4 Keep dressing dry and intact the day of the procedure. The morning after the procedure, remove entire dressing and soak/wash the affected area in warm  water (you may add Epsom salt) for 5 to 10 minutes. Do this twice a day for 2-4 weeks (6-8 weeks if you had phenol) (you may count showering/bathing as one soak).  After soaks, pat the area dry and then allow to airdry for a few minutes. Apply antibiotic ointment to the area and cover with 2 X 2 gauze and paper tape or a band-aid.  5 May walk and pursue everyday activities as tolerated with either an open toe shoe or cut-out shoe as needed. May wear regular shoes if no pain is noted.  6 Watch for any signs and symptoms of infection such as: redness, red streaks going up the foot/leg, swelling, pus or foul odor. Those that have had the phenol procedure, the toe will drain longer and will look like it is infected because it is a chemical burn.  Please call with questions.        Body Mass Index (BMI)  Many things can cause foot and ankle problems. Foot structure, activity level, foot mechanics and injuries are common causes of pain.  One very important issue that often goes unmentioned is body weight. Extra weight can cause increased stress on muscles, ligaments, bones and tendons. Sometimes just a few extra pounds is all it takes to put one over her/his threshold. Without reducing that stress, it can be difficult to alleviate pain.   Some people are uncomfortable addressing this issue, but we feel it is important for you to think about it. As Foot & Ankle specialists, our job is addressing the lower extremity problem and possible causes.   Regarding extra body weight, we encourage patients to discuss diet and weight management plans with their primary care doctors. It is this team approach that gives you the best opportunity for pain relief and getting you back on your feet.

## 2019-01-09 NOTE — PROGRESS NOTES
PATIENT HISTORY:  Randa Arriaga is a 23 year old female who presents to clinic for right 1st toe pain.  Hx of L ingrown nail.  Pain along R 1st toenail.  2 week duration.  1st occurrence.  No treatments.    Review of Systems:  Patient denies fever, chills, rash, wound, stiffness, limping, numbness, weakness, heart burn, blood in stool, chest pain with activity, calf pain when walking, shortness of breath with activity, chronic cough, easy bleeding/bruising, swelling of ankles, excessive thirst, fatigue, depression, anxiety.      PAST MEDICAL HISTORY: History reviewed. No pertinent past medical history.     PAST SURGICAL HISTORY:   Past Surgical History:   Procedure Laterality Date     ENT SURGERY      PE tubes under 1 year      ORTHOPEDIC SURGERY      9th right hand 2nd digit finger        MEDICATIONS: No current outpatient medications on file.     ALLERGIES:  No Known Allergies     SOCIAL HISTORY:   Social History     Socioeconomic History     Marital status: Single     Spouse name: Not on file     Number of children: Not on file     Years of education: Not on file     Highest education level: Not on file   Social Needs     Financial resource strain: Not on file     Food insecurity - worry: Not on file     Food insecurity - inability: Not on file     Transportation needs - medical: Not on file     Transportation needs - non-medical: Not on file   Occupational History     Not on file   Tobacco Use     Smoking status: Never Smoker     Smokeless tobacco: Never Used   Substance and Sexual Activity     Alcohol use: No     Drug use: No     Sexual activity: Not Currently     Partners: Male     Birth control/protection: None   Other Topics Concern     Parent/sibling w/ CABG, MI or angioplasty before 65F 55M? No   Social History Narrative     Not on file        FAMILY HISTORY: History reviewed. No pertinent family history.     EXAM:Vitals: /84 (Cuff Size: Adult Large)   Pulse 68   Temp 98  F (36.7  C) (Oral)    "Ht 1.803 m (5' 11\")   Wt 127.5 kg (281 lb)   BMI 39.19 kg/m    BMI= Body mass index is 39.19 kg/m .    General appearance: Patient is alert and fully cooperative with history & exam.  No sign of distress is noted during the visit.     Psychiatric: Affect is pleasant & appropriate.  Patient appears motivated to improve health.     Respiratory: Breathing is regular & unlabored while sitting.     HEENT: Hearing is intact to spoken word.  Speech is clear.  No gross evidence of visual impairment that would impact ambulation.     Dermatologic: R hallux nail ingrown along lateral border with pain.  Does not appear infected today.     Vascular: DP & PT pulses are intact & regular on the R.  No significant edema or varicosities noted.  CFT and skin temperature are normal to both lower extremities.     Neurologic: Lower extremity sensation is intact to light touch.  No evidence of weakness or contracture in the lower extremities.  No evidence of neuropathy.     Musculoskeletal: Patient is ambulatory without assistive device or brace.  No gross ankle deformity noted.  No foot or ankle joint effusion is noted.     ASSESSMENT: R hallux ingrown nail.     PLAN:  Reviewed patient's chart in epic.  Discussed condition and treatment options including pros and cons.    The potential causes and nature of an ingrown toenail were discussed with the patient.  We reviewed the natural history/prognosis of the condition and potential risks if no treatment is provided.      Treatment options discussed included conservative management (soaking of foot, adequate width shoes)  as well as surgical management (partial or total nail removal).  The pros and cons of both forms of treatment were reviewed.      After thorough discussion and answering all questions, the patient elected to proceed with nonpermanent partial nail avulsion.  Discussed risk of recurrence.    Procedure:  In addition to office visit.  After consent, the right 1st toe was " anesthetized with 5cc's of 1% lidocaine plain after alcohol prep. Betadine prep performed.  A tourniquet was applied to the toe. Using sterile instrumentation, the medial border was then raised from the nail bed and then cut the length of the nail.  The offending nail border was then removed.   Bacitracin was applied to the nail bed.  The tourniquet was removed and a hyperemic response was noted.  Bandage was applied to the toe.  The patient tolerated the procedure and anesthesia well.    Post op instructions provided and discussed with pt.  F/u prn.        Roberto Carlos Lucas DPM, FACFAS    Weight management plan: Patient was referred to their PCP to discuss a diet and exercise plan.

## 2019-03-20 ENCOUNTER — OFFICE VISIT (OUTPATIENT)
Dept: PODIATRY | Facility: CLINIC | Age: 24
End: 2019-03-20
Payer: COMMERCIAL

## 2019-03-20 VITALS
HEIGHT: 71 IN | BODY MASS INDEX: 39.48 KG/M2 | DIASTOLIC BLOOD PRESSURE: 81 MMHG | HEART RATE: 72 BPM | SYSTOLIC BLOOD PRESSURE: 129 MMHG | WEIGHT: 282 LBS

## 2019-03-20 DIAGNOSIS — L60.0 INGROWING NAIL: Primary | ICD-10-CM

## 2019-03-20 PROCEDURE — 99213 OFFICE O/P EST LOW 20 MIN: CPT | Performed by: PODIATRIST

## 2019-03-20 ASSESSMENT — MIFFLIN-ST. JEOR: SCORE: 2140.27

## 2019-03-20 NOTE — PROGRESS NOTES
"PATIENT HISTORY:  Randa Arriaga is a 23 year old female who presents to clinic for L 1st toenail pain, resolving with soaking.  1 week duration.  Was worse after a pedicure.  Prior nonpermanent partial nail avulsion 9/2018.  R side has healed/resolved.  3/10 pain on the L.  No fevers, chills, drainage.  Nonsmoker.  Nondiabetic.  Family hx of DM.     EXAM:Vitals: /81   Pulse 72   Ht 1.803 m (5' 11\")   Wt 127.9 kg (282 lb)   BMI 39.33 kg/m    BMI= Body mass index is 39.33 kg/m .    General appearance: Patient is alert and fully cooperative with history & exam.  No sign of distress is noted during the visit.     Dermatologic: L hallux nail lateral border incurvated with mild pain to pressure.  No evidence of soft tissue infection is noted.  R 1st toe healed.     Vascular: DP & PT pulses are intact & regular on the L.  No significant edema or varicosities noted.  CFT and skin temperature are normal to both lower extremities.     Neurologic: Lower extremity sensation is intact to light touch.  No evidence of weakness or contracture in the lower extremities.  No evidence of neuropathy.     Musculoskeletal: Patient is ambulatory without assistive device or brace.  No gross ankle deformity noted.  No foot or ankle joint effusion is noted.     ASSESSMENT: L hallux ingrowing nail.     PLAN:  Reviewed patient's chart in epic.  Discussed condition and treatment options including pros and cons.    The potential causes and nature of an ingrown toenail were discussed with the patient.  We reviewed the natural history/prognosis of the condition and potential risks if no treatment is provided.      Treatment options discussed included conservative management (soaking of foot, adequate width shoes)  as well as surgical management (partial or total nail removal).  The pros and cons of both forms of treatment were reviewed.      After thorough discussion and answering all questions, the patient elected to continue " soaking/monitoring.  I offered partial nail avulsion procedure, but she declined.  Likely need for permanent procedure in the future if this worsens/fails to improve.     F/u prn.    Roberto Carlos Lucas DPM, FACFAS    Weight management plan: Patient was referred to their PCP to discuss a diet and exercise plan.

## 2019-03-20 NOTE — LETTER
"    3/20/2019         RE: Randa Arriaga  [address] [facility] [address]        Dear Colleague,    Thank you for referring your patient, Randa Arriaga, to the [facility]. Please see a copy of my visit note below.    PATIENT HISTORY:  Randa Arriaga is a 23 year old female who presents to clinic for L 1st toenail pain, resolving with soaking.  1 week duration.  Was worse after a pedicure.  Prior nonpermanent partial nail avulsion 9/2018.  R side has healed/resolved.  3/10 pain on the L.  No fevers, chills, drainage.  Nonsmoker.  Nondiabetic.  Family hx of DM.     EXAM:Vitals: /81   Pulse 72   Ht 1.803 m (5' 11\")   Wt 127.9 kg (282 lb)   BMI 39.33 kg/m     BMI= Body mass index is 39.33 kg/m .    General appearance: Patient is alert and fully cooperative with history & exam.  No sign of distress is noted during the visit.     Dermatologic: L hallux nail lateral border incurvated with mild pain to pressure.  No evidence of soft tissue infection is noted.  R 1st toe healed.     Vascular: DP & PT pulses are intact & regular on the L.  No significant edema or varicosities noted.  CFT and skin temperature are normal to both lower extremities.     Neurologic: Lower extremity sensation is intact to light touch.  No evidence of weakness or contracture in the lower extremities.  No evidence of neuropathy.     Musculoskeletal: Patient is ambulatory without assistive device or brace.  No gross ankle deformity noted.  No foot or ankle joint effusion is noted.     ASSESSMENT: L hallux ingrowing nail.     PLAN:  Reviewed patient's chart in epic.  Discussed condition and treatment options including pros and cons.    The potential causes and nature of an ingrown toenail were discussed with the patient.  We reviewed the natural history/prognosis of the condition and potential risks if no treatment is provided.      Treatment options discussed included conservative management (soaking of " foot, adequate width shoes)  as well as surgical management (partial or total nail removal).  The pros and cons of both forms of treatment were reviewed.      After thorough discussion and answering all questions, the patient elected to continue soaking/monitoring.  I offered partial nail avulsion procedure, but she declined.  Likely need for permanent procedure in the future if this worsens/fails to improve.     F/u prn.    Roberto Carlos Lucas DPM, FACFAS    Weight management plan: Patient was referred to their PCP to discuss a diet and exercise plan.        Again, thank you for allowing me to participate in the care of your patient.        Sincerely,        Roberto Carlos Lucas DPM

## 2019-05-22 ENCOUNTER — OFFICE VISIT (OUTPATIENT)
Dept: PODIATRY | Facility: CLINIC | Age: 24
End: 2019-05-22
Payer: COMMERCIAL

## 2019-05-22 VITALS
HEART RATE: 72 BPM | HEIGHT: 71 IN | BODY MASS INDEX: 39.34 KG/M2 | SYSTOLIC BLOOD PRESSURE: 121 MMHG | DIASTOLIC BLOOD PRESSURE: 82 MMHG | WEIGHT: 281 LBS

## 2019-05-22 DIAGNOSIS — L60.0 INGROWING NAIL: Primary | ICD-10-CM

## 2019-05-22 PROCEDURE — 11750 EXCISION NAIL&NAIL MATRIX: CPT | Mod: TA | Performed by: PODIATRIST

## 2019-05-22 ASSESSMENT — MIFFLIN-ST. JEOR: SCORE: 2135.74

## 2019-05-22 NOTE — PROGRESS NOTES
"Pt returns to clinic today for continuing L hallux toenail pain, ingrowing nail.  Denies infection.  She would like permanent partial avulsion today.  This was discussed prior.  L hallux nail ingrown along lateral border with local pain.  No sign of infection.  No other changes to exam/history.    /82   Pulse 72   Ht 1.803 m (5' 11\")   Wt 127.5 kg (281 lb)   BMI 39.19 kg/m      Procedure:  After consent, the L 1st toe was anesthetized with 5cc's of 1% lidocaine plain after alcohol prep. Betadine prep performed.  A tourniquet was applied to the toe. Using sterile instrumentation, the lateral border was then raised from the nail bed and then cut the length of the nail.  The offending nail border was then removed.  Three 30 second applications of phenol were applied to the nail bed and nail matrix.  The area was then flushed with copious amounts of alcohol.  Bacitracin was applied to the nail bed.  The tourniquet was removed and a hyperemic response was noted.  Bandage was applied to the toe.  The patient tolerated the procedure and anesthesia well.    Post op instructions provided and discussed with pt.  F/u in 2 wks.    Roberto Carlos Lucas DPM, FACFAS    Weight management plan: Patient was referred to their PCP to discuss a diet and exercise plan.        "

## 2019-05-22 NOTE — PATIENT INSTRUCTIONS
Thank you for choosing [facility] / [facility]!    Follow up in 2 weeks    DR. SILVA'S CLINIC LOCATIONS     MONDAY  [place] TUESDAY & FRIDAY AM  EMILY   [address] [facility]   [address]   [place] [address] [place] [address]   [phone]  [phone]       WEDNESDAY  [place] SCHEDULE SURGERY: [phone]   [address] [place] [address] APPOINTMENTS: [phone]   [place] [address] BILLING QUESTIONS: [phone]   [phone]         INGROWN TOENAIL  When a toenail is ingrown, it is curved and grows into the skin, usually at the nail borders (the sides of the nail). This  digging in  of the nail irritates the skin, often creating pain, redness, swelling, and warmth in the toe.  If an ingrown nail causes a break in the skin, bacteria may enter and cause an infection in the area, which is often marked by drainage and a foul odor. However, even if the toe isn t painful, red, swollen, or warm, a nail that curves downward into the skin can progress to an infection.  CAUSES  Causes of ingrown toenails include:    Heredity. In many people, the tendency for ingrown toenails is inherited.     Trauma. Sometimes an ingrown toenail is the result of trauma, such as stubbing your toe, having an object fall on your toe, or engaging in activities that involve repeated pressure on the toes, such as kicking or running.     Improper trimming. The most common cause of ingrown toenails is cutting your nails too short. This encourages the skin next to the nail to fold over the nail.     Improperly sized footwear. Ingrown toenails can result from wearing socks and shoes that are tight or short.     Nail Conditions. Ingrown toenails can be caused by nail problems, such as fungal infections or losing a nail due to trauma.   TREATMENT  Sometimes initial treatment for ingrown toenails can be safely performed at home. However, home treatment is  strongly discouraged if an infection is suspected, or for those who have medical conditions that put feet at high risk, such as diabetes, nerve damage in the foot, or poor circulation.  Home care:  If you don t have an infection or any of the above medical conditions, you can soak your foot in room-temperature water (adding Epsom s salt may be recommended by your doctor), and gently massage the side of the nail fold to help reduce the inflammation.  Avoid attempting  bathroom surgery.  Repeated cutting of the nail can cause the condition to worsen over time. If your symptoms fail to improve, it s time to see a foot and ankle surgeon.  Physician care:  After examining the toe, the foot and ankle surgeon will select the treatment best suited for you. If an infection is present, an oral antibiotic may be prescribed.  Sometimes a minor surgical procedure, often performed in the office, will ease the pain and remove the offending nail. After applying a local anesthetic, the doctor removes part of the nail s side border. Some nails may become ingrown again, requiring removal of the nail root.  Following the nail procedure, a light bandage will be applied. Most people experience very little pain after surgery and may resume normal activity the next day. If your surgeon has prescribed an oral antibiotic, be sure to take all the medication, even if your symptoms have improved.    PREVENTION  Many cases of ingrown toenails may be prevented by:    Proper trimming. Cut toenails in a fairly straight line, and don t cut them too short. You should be able to get your fingernail under the sides and end of the nail.     Well-fitted shoes and socks. Don t wear shoes that are short or tight in the toe area. Avoid shoes that are loose, because they too cause pressure on the toes, especially when running or walking briskly.     INGROWN TOENAIL POSTOPERATIVE INSTRUCTIONS   (Nail avulsion or chemical matrixectomy)   Go directly home and  elevate the affected foot on one or two pillows for the remainder of the day/evening. Your toe may stay numb for the next 2-8 hours.   Take Tylenol, ibuprofen or another anti-inflammatory as needed for pain.   Take antibiotic if that has been prescribed. Take the entire prescribed antibiotic even if your symptoms have improved.   Keep dressing dry and intact the day of the procedure. The morning after the procedure, remove entire dressing and soak/wash the affected area in warm water (you may add Epsom salt) for 5 to 10 minutes. Do this twice a day for 2-4 weeks (6-8 weeks if you had phenol) (you may count showering/bathing as one soak).  After soaks, pat the area dry and then allow to airdry for a few minutes. Apply antibiotic ointment to the area and cover with 2 X 2 gauze and paper tape or a band-aid.  May walk and pursue everyday activities as tolerated with either an open toe shoe or cut-out shoe as needed. May wear regular shoes if no pain is noted.  Watch for any signs and symptoms of infection such as: redness, red streaks going up the foot/leg, swelling, pus or foul odor. Those that have had the phenol procedure, the toe will drain longer and will look like it is infected because it is a chemical burn.  Please call with questions.        BODY WEIGHT AND YOUR FEET  The following information is included in the after visit summary for all patients. Body weight can be a sensitive issue to discuss in clinic, but we think the following information is very important. Although we focus on the feet and ankles, we do support the overall health of our patients.     Many things can cause foot and ankle problems. Foot structure, activity level, foot mechanics and injuries are common causes of pain. One very important issue that often goes unmentioned, is body weight. Extra weight can cause increased stress on muscles, ligaments, bones and tendons. Sometimes just a few extra pounds is all it takes to put one over her/his  threshold. Without reducing that stress, it can be difficult to alleviate pain. As Foot & Ankle specialists, our job is addressing the lower extremity problem and possible causes. Regarding extra body weight, we encourage patients to discuss diet and weight management plans with their primary care doctors. It is this team approach that gives you the best opportunity for pain relief and getting you back on your feet.      [facility] has a Comprehensive Weight Management Program. This program includes counseling, education, non-surgical and surgical approaches to weight loss. If you are interested in learning more either talk to you primary care provider or call [phone].

## 2019-05-22 NOTE — LETTER
"    5/22/2019         RE: Randa Arriaga  [address] [facility] [address]        Dear Colleague,    Thank you for referring your patient, Randa Arriaga, to the [facility]. Please see a copy of my visit note below.    Pt returns to clinic today for continuing L hallux toenail pain, ingrowing nail.  Denies infection.  She would like permanent partial avulsion today.  This was discussed prior.  L hallux nail ingrown along lateral border with local pain.  No sign of infection.  No other changes to exam/history.    /82   Pulse 72   Ht 1.803 m (5' 11\")   Wt 127.5 kg (281 lb)   BMI 39.19 kg/m       Procedure:  After consent, the L 1st toe was anesthetized with 5cc's of 1% lidocaine plain after alcohol prep. Betadine prep performed.  A tourniquet was applied to the toe. Using sterile instrumentation, the lateral border was then raised from the nail bed and then cut the length of the nail.  The offending nail border was then removed.  Three 30 second applications of phenol were applied to the nail bed and nail matrix.  The area was then flushed with copious amounts of alcohol.  Bacitracin was applied to the nail bed.  The tourniquet was removed and a hyperemic response was noted.  Bandage was applied to the toe.  The patient tolerated the procedure and anesthesia well.    Post op instructions provided and discussed with pt.  F/u in 2 wks.    Roberto Carlos Lucas DPM, FACFAS    Weight management plan: Patient was referred to their PCP to discuss a diet and exercise plan.          Again, thank you for allowing me to participate in the care of your patient.        Sincerely,        Roberto Carlos Lucas DPM    "

## 2019-10-16 ENCOUNTER — OFFICE VISIT (OUTPATIENT)
Dept: PODIATRY | Facility: CLINIC | Age: 24
End: 2019-10-16
Payer: COMMERCIAL

## 2019-10-16 VITALS
DIASTOLIC BLOOD PRESSURE: 75 MMHG | WEIGHT: 252.6 LBS | HEART RATE: 60 BPM | HEIGHT: 71 IN | SYSTOLIC BLOOD PRESSURE: 121 MMHG | BODY MASS INDEX: 35.36 KG/M2

## 2019-10-16 DIAGNOSIS — L60.0 INGROWING NAIL: Primary | ICD-10-CM

## 2019-10-16 PROCEDURE — 11750 EXCISION NAIL&NAIL MATRIX: CPT | Mod: T5 | Performed by: PODIATRIST

## 2019-10-16 ASSESSMENT — MIFFLIN-ST. JEOR: SCORE: 2001.92

## 2019-10-16 NOTE — PATIENT INSTRUCTIONS
Thank you for choosing [facility] / [facility]!    Follow up in 2 weeks    DR. SILVA'S CLINIC LOCATIONS     MONDAY  [place] TUESDAY & FRIDAY AM  EMILY   [address] [facility]   [address]   [place] [address] [place] [address]   [phone]  [phone]       WEDNESDAY  [place] SCHEDULE SURGERY: [phone]   [address] [place] [address] APPOINTMENTS: [phone]   [place] [address] BILLING QUESTIONS: [phone]   [phone]         INGROWN TOENAIL  When a toenail is ingrown, it is curved and grows into the skin, usually at the nail borders (the sides of the nail). This  digging in  of the nail irritates the skin, often creating pain, redness, swelling, and warmth in the toe.  If an ingrown nail causes a break in the skin, bacteria may enter and cause an infection in the area, which is often marked by drainage and a foul odor. However, even if the toe isn t painful, red, swollen, or warm, a nail that curves downward into the skin can progress to an infection.  CAUSES  Causes of ingrown toenails include:    Heredity. In many people, the tendency for ingrown toenails is inherited.     Trauma. Sometimes an ingrown toenail is the result of trauma, such as stubbing your toe, having an object fall on your toe, or engaging in activities that involve repeated pressure on the toes, such as kicking or running.     Improper trimming. The most common cause of ingrown toenails is cutting your nails too short. This encourages the skin next to the nail to fold over the nail.     Improperly sized footwear. Ingrown toenails can result from wearing socks and shoes that are tight or short.     Nail Conditions. Ingrown toenails can be caused by nail problems, such as fungal infections or losing a nail due to trauma.   TREATMENT  Sometimes initial treatment for ingrown toenails can be safely performed at home. However, home treatment is  strongly discouraged if an infection is suspected, or for those who have medical conditions that put feet at high risk, such as diabetes, nerve damage in the foot, or poor circulation.  Home care:  If you don t have an infection or any of the above medical conditions, you can soak your foot in room-temperature water (adding Epsom s salt may be recommended by your doctor), and gently massage the side of the nail fold to help reduce the inflammation.  Avoid attempting  bathroom surgery.  Repeated cutting of the nail can cause the condition to worsen over time. If your symptoms fail to improve, it s time to see a foot and ankle surgeon.  Physician care:  After examining the toe, the foot and ankle surgeon will select the treatment best suited for you. If an infection is present, an oral antibiotic may be prescribed.  Sometimes a minor surgical procedure, often performed in the office, will ease the pain and remove the offending nail. After applying a local anesthetic, the doctor removes part of the nail s side border. Some nails may become ingrown again, requiring removal of the nail root.  Following the nail procedure, a light bandage will be applied. Most people experience very little pain after surgery and may resume normal activity the next day. If your surgeon has prescribed an oral antibiotic, be sure to take all the medication, even if your symptoms have improved.    PREVENTION  Many cases of ingrown toenails may be prevented by:    Proper trimming. Cut toenails in a fairly straight line, and don t cut them too short. You should be able to get your fingernail under the sides and end of the nail.     Well-fitted shoes and socks. Don t wear shoes that are short or tight in the toe area. Avoid shoes that are loose, because they too cause pressure on the toes, especially when running or walking briskly.     INGROWN TOENAIL POSTOPERATIVE INSTRUCTIONS   (Nail avulsion or chemical matrixectomy)   Go directly home and  elevate the affected foot on one or two pillows for the remainder of the day/evening. Your toe may stay numb for the next 2-8 hours.   Take Tylenol, ibuprofen or another anti-inflammatory as needed for pain.   Take antibiotic if that has been prescribed. Take the entire prescribed antibiotic even if your symptoms have improved.   Keep dressing dry and intact the day of the procedure. The morning after the procedure, remove entire dressing and soak/wash the affected area in warm water (you may add Epsom salt) for 5 to 10 minutes. Do this twice a day for 2-4 weeks (6-8 weeks if you had phenol) (you may count showering/bathing as one soak).  After soaks, pat the area dry and then allow to airdry for a few minutes. Apply antibiotic ointment to the area and cover with 2 X 2 gauze and paper tape or a band-aid.  May walk and pursue everyday activities as tolerated with either an open toe shoe or cut-out shoe as needed. May wear regular shoes if no pain is noted.  Watch for any signs and symptoms of infection such as: redness, red streaks going up the foot/leg, swelling, pus or foul odor. Those that have had the phenol procedure, the toe will drain longer and will look like it is infected because it is a chemical burn.  Please call with questions.      BODY WEIGHT AND YOUR FEET  The following information is included in the after visit summary for all patients. Body weight can be a sensitive issue to discuss in clinic, but we think the following information is very important. Although we focus on the feet and ankles, we do support the overall health of our patients.     Many things can cause foot and ankle problems. Foot structure, activity level, foot mechanics and injuries are common causes of pain. One very important issue that often goes unmentioned, is body weight. Extra weight can cause increased stress on muscles, ligaments, bones and tendons. Sometimes just a few extra pounds is all it takes to put one over her/his  threshold. Without reducing that stress, it can be difficult to alleviate pain. As Foot & Ankle specialists, our job is addressing the lower extremity problem and possible causes. Regarding extra body weight, we encourage patients to discuss diet and weight management plans with their primary care doctors. It is this team approach that gives you the best opportunity for pain relief and getting you back on your feet.      [facility] has a Comprehensive Weight Management Program. This program includes counseling, education, non-surgical and surgical approaches to weight loss. If you are interested in learning more either talk to you primary care provider or call [phone].

## 2019-10-16 NOTE — LETTER
"    10/16/2019         RE: Randa Arriaga  [address] [facility] [address]        Dear Colleague,    Thank you for referring your patient, Randa Arriaga, to the [facility]. Please see a copy of my visit note below.    PATIENT HISTORY:  Randa Arriaga is a 24 year old female who presents to clinic for R hallux nail pain, both borders.  Hx of ingrown nails.  Prior L 1st toe permanent partial avulsion that is resolved per pt.  5/10 pain.  Worse with pressure, better with rest.  Pt is requesting permanent partial removal of the nail, both borders on the R 1st toe.  No fevers, chills, drainage.  Nonsmoker.  Nondiabetic.  No related family hx.       EXAM:Vitals: /75   Pulse 60   Ht 1.803 m (5' 11\")   Wt 114.6 kg (252 lb 9.6 oz)   BMI 35.23 kg/m     BMI= Body mass index is 35.23 kg/m .    General appearance: Patient is alert and fully cooperative with history & exam.  No sign of distress is noted during the visit.     Dermatologic: R 1st toenail ingrown and painful along both borders.  No evidence of soft tissue infection is noted.     Vascular: DP & PT pulses are intact & regular on the R.  No significant edema or varicosities noted.  CFT and skin temperature are normal.     Neurologic: Lower extremity sensation is intact to light touch.  No evidence of weakness or contracture in the lower extremities.  No evidence of neuropathy.     Musculoskeletal: Patient is ambulatory without assistive device or brace.  No gross ankle deformity noted.  No foot or ankle joint effusion is noted.     ASSESSMENT: R 1st toe ingrowing nail     PLAN:  Reviewed patient's chart in epic.  Discussed condition and treatment options including pros and cons.    The potential causes and nature of an ingrown toenail were discussed with the patient.  We reviewed the natural history/prognosis of the condition and potential risks if no treatment is provided.      Treatment options discussed included " conservative management (soaking of foot, adequate width shoes)  as well as surgical management (partial or total nail removal).  The pros and cons of both forms of treatment were reviewed.      After thorough discussion and answering all questions, the patient elected to proceed with permanent partial nail avulsion of R hallux nail, both borders.  Discussed risk of infection, recurrence.    Procedure:  In addition to office visit.  After consent, the R 1st toe was anesthetized with 5cc's of 1% lidocaine plain after alcohol prep. Betadine prep performed.  A tourniquet was applied to the toe. Using sterile instrumentation, the medial and lateral borders were then raised from the nail bed and then cut the length of the nail.  The offending nail borders were then removed.  Three 30 second applications of phenol were applied to the nail bed and nail matrix on each side.  The area was then flushed with copious amounts of alcohol.  Bacitracin was applied to the nail bed.  The tourniquet was removed and a hyperemic response was noted.  Bandage was applied to the toe.  The patient tolerated the procedure and anesthesia well.    Post op instructions provided and discussed with pt.  F/u in 2 wks.      Roberto Carlos Lucas DPM, FACFAS    Weight management plan: Patient was referred to their PCP to discuss a diet and exercise plan.        Again, thank you for allowing me to participate in the care of your patient.        Sincerely,        Roberto Carlos Lucas DPM

## 2019-10-16 NOTE — PROGRESS NOTES
"PATIENT HISTORY:  Randa Arriaga is a 24 year old female who presents to clinic for R hallux nail pain, both borders.  Hx of ingrown nails.  Prior L 1st toe permanent partial avulsion that is resolved per pt.  5/10 pain.  Worse with pressure, better with rest.  Pt is requesting permanent partial removal of the nail, both borders on the R 1st toe.  No fevers, chills, drainage.  Nonsmoker.  Nondiabetic.  No related family hx.       EXAM:Vitals: /75   Pulse 60   Ht 1.803 m (5' 11\")   Wt 114.6 kg (252 lb 9.6 oz)   BMI 35.23 kg/m    BMI= Body mass index is 35.23 kg/m .    General appearance: Patient is alert and fully cooperative with history & exam.  No sign of distress is noted during the visit.     Dermatologic: R 1st toenail ingrown and painful along both borders.  No evidence of soft tissue infection is noted.     Vascular: DP & PT pulses are intact & regular on the R.  No significant edema or varicosities noted.  CFT and skin temperature are normal.     Neurologic: Lower extremity sensation is intact to light touch.  No evidence of weakness or contracture in the lower extremities.  No evidence of neuropathy.     Musculoskeletal: Patient is ambulatory without assistive device or brace.  No gross ankle deformity noted.  No foot or ankle joint effusion is noted.     ASSESSMENT: R 1st toe ingrowing nail     PLAN:  Reviewed patient's chart in epic.  Discussed condition and treatment options including pros and cons.    The potential causes and nature of an ingrown toenail were discussed with the patient.  We reviewed the natural history/prognosis of the condition and potential risks if no treatment is provided.      Treatment options discussed included conservative management (soaking of foot, adequate width shoes)  as well as surgical management (partial or total nail removal).  The pros and cons of both forms of treatment were reviewed.      After thorough discussion and answering all questions, the " patient elected to proceed with permanent partial nail avulsion of R hallux nail, both borders.  Discussed risk of infection, recurrence.    Procedure:  In addition to office visit.  After consent, the R 1st toe was anesthetized with 5cc's of 1% lidocaine plain after alcohol prep. Betadine prep performed.  A tourniquet was applied to the toe. Using sterile instrumentation, the medial and lateral borders were then raised from the nail bed and then cut the length of the nail.  The offending nail borders were then removed.  Three 30 second applications of phenol were applied to the nail bed and nail matrix on each side.  The area was then flushed with copious amounts of alcohol.  Bacitracin was applied to the nail bed.  The tourniquet was removed and a hyperemic response was noted.  Bandage was applied to the toe.  The patient tolerated the procedure and anesthesia well.    Post op instructions provided and discussed with pt.  F/u in 2 wks.      Roberto Carlos Lucas DPM, FACFAS    Weight management plan: Patient was referred to their PCP to discuss a diet and exercise plan.

## 2020-03-10 ENCOUNTER — HEALTH MAINTENANCE LETTER (OUTPATIENT)
Age: 25
End: 2020-03-10

## 2020-11-23 NOTE — PROGRESS NOTES
"Chief Complaint   Patient presents with     Nexplanon     Replacement.        Initial Temp 99.1  F (37.3  C) (Oral)   Ht 1.74 m (5' 8.5\")   Wt 135.1 kg (297 lb 12.8 oz)   Breastfeeding No   BMI 44.62 kg/m   Estimated body mass index is 44.62 kg/m  as calculated from the following:    Height as of this encounter: 1.74 m (5' 8.5\").    Weight as of this encounter: 135.1 kg (297 lb 12.8 oz).  BP completed using cuff size: large    Questioned patient about current smoking habits.  Pt. has never smoked.          The following HM Due: pap smear  [place]al[place] (13-), tdap      The following patient reported/Care Every where data was sent to:  P ABSTRACT QUALITY INITIATIVES [[id number]]  n/a      n/a, patient has appointment for today and orders have been placed       Clinic Administered Medication Documentation      Injectable Medication Documentation    Patient was given tdap. Prior to medication administration, verified patients identity using patient s name and date of birth. Please see MAR and medication order for additional information. Patient instructed to remain in clinic for 15 minutes.      Was entire vial of medication used? Yes  Vial/Syringe: Syringe  Expiration Date:  2022  Was this medication supplied by the patient? No      NEXPLANON    LOT: [id number]   EXP: 2021   NDC: 1018-3602-61      "

## 2020-11-25 ENCOUNTER — OFFICE VISIT (OUTPATIENT)
Dept: OBGYN | Facility: CLINIC | Age: 25
End: 2020-11-25
Payer: COMMERCIAL

## 2020-11-25 VITALS
HEART RATE: 74 BPM | DIASTOLIC BLOOD PRESSURE: 70 MMHG | TEMPERATURE: 99.1 F | HEIGHT: 69 IN | WEIGHT: 293 LBS | BODY MASS INDEX: 43.4 KG/M2 | SYSTOLIC BLOOD PRESSURE: 116 MMHG

## 2020-11-25 DIAGNOSIS — Z30.017 NEXPLANON INSERTION: ICD-10-CM

## 2020-11-25 DIAGNOSIS — Z23 NEED FOR TDAP VACCINATION: Primary | ICD-10-CM

## 2020-11-25 DIAGNOSIS — Z30.46 SURVEILLANCE OF PREVIOUSLY PRESCRIBED IMPLANTABLE SUBDERMAL CONTRACEPTIVE: ICD-10-CM

## 2020-11-25 DIAGNOSIS — Z30.46 NEXPLANON REMOVAL: ICD-10-CM

## 2020-11-25 PROBLEM — E66.01 MORBID OBESITY (H): Status: ACTIVE | Noted: 2020-11-25

## 2020-11-25 PROCEDURE — 90715 TDAP VACCINE 7 YRS/> IM: CPT | Performed by: OBSTETRICS & GYNECOLOGY

## 2020-11-25 PROCEDURE — 11983 REMOVE/INSERT DRUG IMPLANT: CPT | Performed by: OBSTETRICS & GYNECOLOGY

## 2020-11-25 PROCEDURE — 90471 IMMUNIZATION ADMIN: CPT | Performed by: OBSTETRICS & GYNECOLOGY

## 2020-11-25 RX ORDER — AZITHROMYCIN 500 MG/1
2000 TABLET, FILM COATED ORAL DAILY
Qty: 4 TABLET | Refills: 0 | Status: SHIPPED | OUTPATIENT
Start: 2020-11-25 | End: 2020-11-26

## 2020-11-25 ASSESSMENT — MIFFLIN-ST. JEOR: SCORE: 2162.25

## 2020-11-25 NOTE — PATIENT INSTRUCTIONS
What Nexplanon Users May Expect    For appropiate patients, Nexplanon is well tolerated and has a low early-removal rate.    Insertion site complications, such as prolonged pain or infection, are rare. Removal is occasionally difficult, and rarely requires a surgical procedure in the operating room.    Menstrual changes are common with Nexplanon. Bleeding may become more or less frequent or heavy, or absent. The bleeding pattern after the first three months is predictive of future bleeding, but the pattern may change at any time. Average bleeding is 18 days over 3 months. Over 50% of women experience rare over absent bleeding over the two year period, while 25% experience frequent or prolonged bleeding.    In clinical studies, users gained 3.7 pounds over two years. It is unknown what portion of this weight gain is related to Nexplanon    Women with a history of depressed mood may have worsening on Nexplanon, and may need to have the device removed.    Return to baseline ovulation patterns is seen 7-14 days after removal of Nexplanon.    Rarely, headaches and acne have also let to device removal.    Nexplanon may be less effective in women weight more than 130% of their ideal body weight.    Nexplanon does not protect against HIV or STDs.    Please call [facility] at [phone] if you have questions or concerns.    For more complete information:  [email or web address]

## 2020-11-25 NOTE — PROGRESS NOTES
"  NEXPLANON REMOVAL/REINSERTION:    Is a pregnancy test required: No.  Was a consent obtained?  Yes    Subjective: Randa Arriaga is a 25 year old  presents for Nexplanon.    Patient has been given the opportunity to ask questions about all forms of birth control, including all options appropriate for Randa Arriaga. Discussed that no method of birth control, except abstinence is 100% effective against pregnancy or sexually transmitted infection.     Randa Arriaga understands planning removal and reinsertion today    The entire removal and insertion procedure was reviewed with the patient, including care after placement.      /70 (BP Location: Right arm, Patient Position: Sitting, Cuff Size: Adult Large)   Pulse 74   Temp 99.1  F (37.3  C) (Oral)   Ht 1.74 m (5' 8.5\")   Wt 135.1 kg (297 lb 12.8 oz)   Breastfeeding No   BMI 44.62 kg/m      PROCEDURE NOTE: -- Nexplanon Removal/Insertion    Technique: On the right arm  Skin prep Betadine  Anesthesia 1% lidocaine  Procedure: Patient was placed supine with right arm exposed.  Implant located by palpitation. Stanton was made 8-10 cm above medial epicondyle and a guiding stanton 4 cm above the first.  Arm was prepped with Betadine. Incision point was anesthetized with 3 mL 1% lidocaine along the extraction and planned insertion site.     Small incision (<5mm) was made at distal end of palpable implant, curved hemostat or mosquito forceps was used to isolate the implant and bring it to the incision, the fibrous capsule containing the implant  was incised and the implant was removed intact.    After stretching the skin with thumb and index finger around the insertion site, the implant was inserted in the previous extraction site with the tip of the needle inserted at 30 degrees and then lowered to horizontal position. While lifting the skin with the tip of the needle, inserted the needle to its full length. Applicator was then stabilized and the " slider was unlocked by pushing it slightly down. Slider moved back completely until it stopped. Applicator was then removed.    Correct placement of the implant was confirmed by palpation in the patient's arm and visualizing the purple top of the obturator.   Bandage and pressure dressing applied to insertion site.    Lot # see nursing note    EBL: minimal    Complications: none    ASSESSMENT:     ICD-10-CM    1. Need for Tdap vaccination  Z23 TDAP VACCINE (Adacel, Boostrix)  [[id number]]     ADMIN 1st VACCINE   2. Nexplanon insertion  Z30.017 azithromycin (ZITHROMAX) 500 MG tablet     azithromycin (ZITHROMAX) 500 MG tablet     etonogestrel (NEXPLANON) subdermal implant 68 mg     etonogestrel (NEXPLANON) 68 MG IMPL     REMOVAL AND REINSERTION NEXPLANON   3. Nexplanon removal  Z30.46 azithromycin (ZITHROMAX) 500 MG tablet     azithromycin (ZITHROMAX) 500 MG tablet   4. Surveillance of previously prescribed implantable subdermal contraceptive  Z30.46         PLAN:    Given 's handouts, including when to have Nexplanon removed, list of danger s/sx, side effects and follow up recommended. Encouraged condom use for prevention of STD. Back up contraception advised for 7 days. Advised to call for any fever, for prolonged or severe pain or bleeding, abnormal vaginal dischage. She was advised to use pain medications (ibuprofen) as needed for mild to moderate pain.     Miri Spicer MD

## 2021-03-05 NOTE — PATIENT INSTRUCTIONS
Thank you for choosing [facility]!    Follow up as needed    DR. SILVA'S CLINIC LOCATIONS     MONDAY  [place] TUESDAY & FRIDAY AM  EMILY   [address]   [address]   [place] [address] [place] [address]   [phone]  [phone]  [phone]       WEDNESDAY  [place] SCHEDULE SURGERY: [phone]   [address] [facility] APPOINTMENTS: [phone]   [place] [address] BILLING QUESTIONS: [phone]   [phone]         Ingrown Toenail          What Is an Ingrown Toenail?  When a toenail is ingrown, it is curved and grows into the skin, usually at the nail borders (the sides of the nail). This  digging in  of the nail irritates the skin, often creating pain, redness, swelling, and warmth in the toe.  If an ingrown nail causes a break in the skin, bacteria may enter and cause an infection in the area, which is often marked by drainage and a foul odor. However, even if the toe isn t painful, red, swollen, or warm, a nail that curves downward into the skin can progress to an infection.  Causes  Causes of ingrown toenails include:  Heredity. In many people, the tendency for ingrown toenails is inherited.   Trauma. Sometimes an ingrown toenail is the result of trauma, such as stubbing your toe, having an object fall on your toe, or engaging in activities that involve repeated pressure on the toes, such as kicking or running.   Improper trimming. The most common cause of ingrown toenails is cutting your nails too short. This encourages the skin next to the nail to fold over the nail.   Improperly sized footwear. Ingrown toenails can result from wearing socks and shoes that are tight or short.   Nail Conditions. Ingrown toenails can be caused by nail problems, such as fungal infections or losing a nail due to trauma.   Treatment  Sometimes initial treatment for ingrown toenails can be safely performed at home.  However, home treatment is strongly discouraged if an infection is suspected, or for those who have medical conditions that put feet at high risk, such as diabetes, nerve damage in the foot, or poor circulation.  Home care:  If you don t have an infection or any of the above medical conditions, you can soak your foot in room-temperature water (adding Epsom s salt may be recommended by your doctor), and gently massage the side of the nail fold to help reduce the inflammation.  Avoid attempting  bathroom surgery.  Repeated cutting of the nail can cause the condition to worsen over time. If your symptoms fail to improve, it s time to see a foot and ankle surgeon.  Physician care:  After examining the toe, the foot and ankle surgeon will select the treatment best suited for you. If an infection is present, an oral antibiotic may be prescribed.  Sometimes a minor surgical procedure, often performed in the office, will ease the pain and remove the offending nail. After applying a local anesthetic, the doctor removes part of the nail s side border. Some nails may become ingrown again, requiring removal of the nail root.  Following the nail procedure, a light bandage will be applied. Most people experience very little pain after surgery and may resume normal activity the next day. If your surgeon has prescribed an oral antibiotic, be sure to take all the medication, even if your symptoms have improved.      Preventing Ingrown Toenails  Many cases of ingrown toenails may be prevented by:  Proper trimming. Cut toenails in a fairly straight line, and don t cut them too short. You should be able to get your fingernail under the sides and end of the nail.   Well-fitted shoes and socks. Don t wear shoes that are short or tight in the toe area. Avoid shoes that are loose, because they too cause pressure on the toes, especially when running or walking briskly.     SOAKING INSTRUCTIONS   Soak/wash the affected area in warm water  (you may add Epsom salt) for 5 to 10 minutes. Do this twice a day.  After soaks, pat the area dry and then allow to airdry for a few minutes. Apply antibiotic ointment to the area and cover with 2 X 2 gauze and paper tape or a band-aid.  May walk and pursue everyday activities as tolerated with either an open toe shoe or cut-out shoe as needed. May wear regular shoes if no pain is noted.  Watch for any signs and symptoms of infection such as: redness, red streaks going up the foot/leg, swelling, pus or foul odor.         BODY WEIGHT AND YOUR FEET  The following information is included in the after visit summary for all patients. Body weight can be a sensitive issue to discuss in clinic, but we think the following information is very important. Although we focus on the feet and ankles, we do support the overall health of our patients.     Many things can cause foot and ankle problems. Foot structure, activity level, foot mechanics and injuries are common causes of pain. One very important issue that often goes unmentioned, is body weight. Extra weight can cause increased stress on muscles, ligaments, bones and tendons. Sometimes just a few extra pounds is all it takes to put one over her/his threshold. Without reducing that stress, it can be difficult to alleviate pain. As Foot & Ankle specialists, our job is addressing the lower extremity problem and possible causes. Regarding extra body weight, we encourage patients to discuss diet and weight management plans with their primary care doctors. It is this team approach that gives you the best opportunity for pain relief and getting you back on your feet.      [facility] has a Comprehensive Weight Management Program. This program includes counseling, education, non-surgical and surgical approaches to weight loss. If you are interested in learning more either talk to you primary care provider or call [phone].     negative

## 2021-03-16 ENCOUNTER — OFFICE VISIT (OUTPATIENT)
Dept: OBGYN | Facility: CLINIC | Age: 26
End: 2021-03-16
Payer: COMMERCIAL

## 2021-03-16 VITALS
BODY MASS INDEX: 43.4 KG/M2 | DIASTOLIC BLOOD PRESSURE: 64 MMHG | TEMPERATURE: 98.5 F | SYSTOLIC BLOOD PRESSURE: 122 MMHG | HEART RATE: 67 BPM | WEIGHT: 293 LBS | HEIGHT: 69 IN

## 2021-03-16 DIAGNOSIS — Z30.46 ENCOUNTER FOR SURVEILLANCE OF NEXPLANON SUBDERMAL CONTRACEPTIVE: Primary | ICD-10-CM

## 2021-03-16 PROCEDURE — 99212 OFFICE O/P EST SF 10 MIN: CPT | Performed by: OBSTETRICS & GYNECOLOGY

## 2021-03-16 ASSESSMENT — MIFFLIN-ST. JEOR: SCORE: 2177.67

## 2021-03-16 NOTE — PROGRESS NOTES
"Randa Arriaga is a 25 year old female who presents today for nexplanon check up.   Feels like the nexplanon shayla is curved in her arm  No other concerns in regards to nexplanon.   Working just fine and she likes it.   No bleeding  Has gained weight since starting to use it.     Wt Readings from Last 4 Encounters:   03/16/21 136.6 kg (301 lb 3.2 oz)   11/25/20 135.1 kg (297 lb 12.8 oz)   10/16/19 114.6 kg (252 lb 9.6 oz)   05/22/19 127.5 kg (281 lb)      OBJECTIVE:   /64 (BP Location: Left arm, Patient Position: Sitting, Cuff Size: Adult Large)   Pulse 67   Temp 98.5  F (36.9  C) (Oral)   Ht 1.74 m (5' 8.5\")   Wt 136.6 kg (301 lb 3.2 oz)   Breastfeeding No   BMI 45.13 kg/m     Gen: no apparent distress   Patient is alert and oriented times three.   normal respiratory and cardiovascular effort      Right arm with nexplanon shayla palpable, toward the proximal end is feels possibly curved toward the axillary region when it is palpated.    It is located under the skin so easy to palpate in the appropriate place     ASSESSMENT:  Normal insertion of nexplanon    PLAN:   Patient reassured it is still effective for contraception.  Likely d/t scar tissue from the previous shayla causing it to seem curved.   Was a challenging removal last time due to depth and scar tissue. ( patient had gained quite a bit of weight after the first insertion)     Miri Spicer MD     "

## 2021-04-24 ENCOUNTER — HEALTH MAINTENANCE LETTER (OUTPATIENT)
Age: 26
End: 2021-04-24

## 2021-10-04 ENCOUNTER — HEALTH MAINTENANCE LETTER (OUTPATIENT)
Age: 26
End: 2021-10-04

## 2022-05-15 ENCOUNTER — HEALTH MAINTENANCE LETTER (OUTPATIENT)
Age: 27
End: 2022-05-15

## 2022-08-16 ENCOUNTER — OFFICE VISIT (OUTPATIENT)
Dept: OBGYN | Facility: CLINIC | Age: 27
End: 2022-08-16
Payer: COMMERCIAL

## 2022-08-16 VITALS
TEMPERATURE: 98.9 F | BODY MASS INDEX: 44.65 KG/M2 | SYSTOLIC BLOOD PRESSURE: 152 MMHG | WEIGHT: 293 LBS | HEART RATE: 92 BPM | DIASTOLIC BLOOD PRESSURE: 83 MMHG

## 2022-08-16 DIAGNOSIS — Z30.46 ENCOUNTER FOR REMOVAL AND REINSERTION OF NEXPLANON: Primary | ICD-10-CM

## 2022-08-16 PROCEDURE — 11983 REMOVE/INSERT DRUG IMPLANT: CPT | Performed by: OBSTETRICS & GYNECOLOGY

## 2022-08-16 RX ORDER — AZITHROMYCIN 1 G/1
1 POWDER, FOR SUSPENSION ORAL ONCE
Qty: 1 PACKET | Refills: 0 | Status: SHIPPED | OUTPATIENT
Start: 2022-08-16 | End: 2022-08-16

## 2022-08-16 ASSESSMENT — PATIENT HEALTH QUESTIONNAIRE - PHQ9: SUM OF ALL RESPONSES TO PHQ QUESTIONS 1-9: 3

## 2022-08-16 NOTE — PROGRESS NOTES
NEXPLANON REMOVAL/REINSERTION:    Is a pregnancy test required: No.  Was a consent obtained?  Yes    Subjective: Randa Arriaga is a 27 year old  presents for Nexplanon exchange.    Feels like her nexplanon that was put in almost 2 yrs ago has broken.   Her periods have come back now over the past few months and are heavy and painful.     Patient has been given the opportunity to ask questions about all forms of birth control, including all options appropriate for Randa Arriaga. Discussed that no method of birth control, except abstinence is 100% effective against pregnancy or sexually transmitted infection.     Randa Arriaga understands planning removal and new insertion today    The entire removal and insertion procedure was reviewed with the patient, including care after placement.      BP (!) 152/83   Pulse 92   Temp 98.9  F (37.2  C)   Wt 135.2 kg (298 lb)   BMI 44.65 kg/m      PROCEDURE NOTE: -- Nexplanon Removal/Insertion    Technique: On the right arm  Skin prep Betadine  Anesthesia 1% lidocaine  Procedure: Patient was placed supine with right arm exposed.  Implant located by palpitation. The implant was broken into 2 separate parts. This could be palpated thru the skin. A Stanton was made in between the 2 parts.   Arm was prepped with Betadine. Incision point was anesthetized with 2 mL 1% lidocaine.     Small incision (<5mm) was made  inbetween the 2 parts and using a curved hemostat the 2 ends of the implant isolated and implants were brought to the surface.  The fibrous capsule containing the implant  was incised and the implants were removed as 2 separate pieces.     The patient requested that the new implant go in her left arm.   The area was prepped with alcohol and 2 ml of lidocaine 1% was injected in the planned insertion site which was located according to the new guidelines in the left arm. It was prepped with betadine.     After stretching the skin with thumb and index  finger around the insertion site, skin punctured with the tip of the needle inserted at 30 degrees and then lowered to horizontal position. While lifting the skin with the tip of the needle, inserted the needle to its full length. Applicator was then stabilized and the slider was unlocked by pushing it slightly down. Slider moved back completely until it stopped. Applicator was then removed.    Correct placement of the implant was confirmed by palpation in the patient's arm and visualizing the purple top of the obturator.   Bandage and pressure dressing applied to insertion site.       EBL: minimal    Complications: none    ASSESSMENT:     ICD-10-CM    1. Encounter for removal and reinsertion of Nexplanon  Z30.46 azithromycin (ZITHROMAX) 1 g powder     etonogestrel (NEXPLANON) subdermal implant 68 mg     etonogestrel (NEXPLANON) 68 MG IMPL     REMOVAL AND REINSERTION NEXPLANON   2. Surveillance of previously prescribed implantable subdermal contraceptive  Z30.46         PLAN:   Advised to call for any fever, for prolonged or severe pain or bleeding, abnormal vaginal dischage. She was advised to use pain medications (ibuprofen) as needed for mild to moderate pain.     Miri Spicer MD

## 2022-09-11 ENCOUNTER — HEALTH MAINTENANCE LETTER (OUTPATIENT)
Age: 27
End: 2022-09-11

## 2023-06-03 ENCOUNTER — HEALTH MAINTENANCE LETTER (OUTPATIENT)
Age: 28
End: 2023-06-03

## 2024-07-07 ENCOUNTER — HEALTH MAINTENANCE LETTER (OUTPATIENT)
Age: 29
End: 2024-07-07

## 2025-07-19 ENCOUNTER — HEALTH MAINTENANCE LETTER (OUTPATIENT)
Age: 30
End: 2025-07-19

## 2025-08-20 ENCOUNTER — OFFICE VISIT (OUTPATIENT)
Dept: OBGYN | Facility: CLINIC | Age: 30
End: 2025-08-20
Payer: COMMERCIAL

## 2025-08-20 VITALS
OXYGEN SATURATION: 99 % | DIASTOLIC BLOOD PRESSURE: 75 MMHG | SYSTOLIC BLOOD PRESSURE: 116 MMHG | HEART RATE: 69 BPM | WEIGHT: 293 LBS | BODY MASS INDEX: 47.38 KG/M2 | TEMPERATURE: 98.9 F

## 2025-08-20 DIAGNOSIS — Z30.46 ENCOUNTER FOR NEXPLANON REMOVAL: ICD-10-CM

## 2025-08-20 DIAGNOSIS — Z30.011 ENCOUNTER FOR INITIAL PRESCRIPTION OF CONTRACEPTIVE PILLS: Primary | ICD-10-CM

## 2025-08-20 RX ORDER — NORETHINDRONE ACETATE AND ETHINYL ESTRADIOL .03; 1.5 MG/1; MG/1
1 TABLET ORAL DAILY
Qty: 84 TABLET | Refills: 3 | Status: SHIPPED | OUTPATIENT
Start: 2025-08-20

## 2025-08-20 ASSESSMENT — ANXIETY QUESTIONNAIRES
1. FEELING NERVOUS, ANXIOUS, OR ON EDGE: NOT AT ALL
IF YOU CHECKED OFF ANY PROBLEMS ON THIS QUESTIONNAIRE, HOW DIFFICULT HAVE THESE PROBLEMS MADE IT FOR YOU TO DO YOUR WORK, TAKE CARE OF THINGS AT HOME, OR GET ALONG WITH OTHER PEOPLE: NOT DIFFICULT AT ALL
2. NOT BEING ABLE TO STOP OR CONTROL WORRYING: SEVERAL DAYS
GAD7 TOTAL SCORE: 3
5. BEING SO RESTLESS THAT IT IS HARD TO SIT STILL: NOT AT ALL
GAD7 TOTAL SCORE: 3
3. WORRYING TOO MUCH ABOUT DIFFERENT THINGS: SEVERAL DAYS
7. FEELING AFRAID AS IF SOMETHING AWFUL MIGHT HAPPEN: NOT AT ALL
6. BECOMING EASILY ANNOYED OR IRRITABLE: NOT AT ALL

## 2025-08-20 ASSESSMENT — PATIENT HEALTH QUESTIONNAIRE - PHQ9
SUM OF ALL RESPONSES TO PHQ QUESTIONS 1-9: 2
5. POOR APPETITE OR OVEREATING: SEVERAL DAYS